# Patient Record
Sex: FEMALE | Race: OTHER | HISPANIC OR LATINO | URBAN - METROPOLITAN AREA
[De-identification: names, ages, dates, MRNs, and addresses within clinical notes are randomized per-mention and may not be internally consistent; named-entity substitution may affect disease eponyms.]

---

## 2023-07-21 ENCOUNTER — EMERGENCY (EMERGENCY)
Facility: HOSPITAL | Age: 38
LOS: 1 days | Discharge: HOME | End: 2023-07-21
Attending: EMERGENCY MEDICINE
Payer: MEDICAID

## 2023-07-21 VITALS
HEIGHT: 60 IN | TEMPERATURE: 99 F | RESPIRATION RATE: 16 BRPM | DIASTOLIC BLOOD PRESSURE: 90 MMHG | WEIGHT: 169.98 LBS | SYSTOLIC BLOOD PRESSURE: 147 MMHG | OXYGEN SATURATION: 99 % | HEART RATE: 73 BPM

## 2023-07-21 LAB
ALBUMIN SERPL ELPH-MCNC: 3.7 G/DL — SIGNIFICANT CHANGE UP (ref 3.5–5)
ALP SERPL-CCNC: 75 U/L — SIGNIFICANT CHANGE UP (ref 40–120)
ALT FLD-CCNC: 42 U/L DA — SIGNIFICANT CHANGE UP (ref 10–60)
ANION GAP SERPL CALC-SCNC: 9 MMOL/L — SIGNIFICANT CHANGE UP (ref 5–17)
APPEARANCE UR: CLEAR — SIGNIFICANT CHANGE UP
AST SERPL-CCNC: 23 U/L — SIGNIFICANT CHANGE UP (ref 10–40)
BACTERIA # UR AUTO: ABNORMAL /HPF
BASOPHILS # BLD AUTO: 0.01 K/UL — SIGNIFICANT CHANGE UP (ref 0–0.2)
BASOPHILS NFR BLD AUTO: 0.2 % — SIGNIFICANT CHANGE UP (ref 0–2)
BILIRUB SERPL-MCNC: 0.2 MG/DL — SIGNIFICANT CHANGE UP (ref 0.2–1.2)
BILIRUB UR-MCNC: NEGATIVE — SIGNIFICANT CHANGE UP
BUN SERPL-MCNC: 11 MG/DL — SIGNIFICANT CHANGE UP (ref 7–18)
CALCIUM SERPL-MCNC: 9.3 MG/DL — SIGNIFICANT CHANGE UP (ref 8.4–10.5)
CHLORIDE SERPL-SCNC: 105 MMOL/L — SIGNIFICANT CHANGE UP (ref 96–108)
CO2 SERPL-SCNC: 25 MMOL/L — SIGNIFICANT CHANGE UP (ref 22–31)
COLOR SPEC: YELLOW — SIGNIFICANT CHANGE UP
CREAT SERPL-MCNC: 0.49 MG/DL — LOW (ref 0.5–1.3)
DIFF PNL FLD: ABNORMAL
EGFR: 125 ML/MIN/1.73M2 — SIGNIFICANT CHANGE UP
EOSINOPHIL # BLD AUTO: 0.07 K/UL — SIGNIFICANT CHANGE UP (ref 0–0.5)
EOSINOPHIL NFR BLD AUTO: 1.1 % — SIGNIFICANT CHANGE UP (ref 0–6)
EPI CELLS # UR: SIGNIFICANT CHANGE UP /HPF
GLUCOSE SERPL-MCNC: 102 MG/DL — HIGH (ref 70–99)
GLUCOSE UR QL: NEGATIVE — SIGNIFICANT CHANGE UP
HCG SERPL-ACNC: HIGH MIU/ML
HCT VFR BLD CALC: 43.4 % — SIGNIFICANT CHANGE UP (ref 34.5–45)
HGB BLD-MCNC: 14.3 G/DL — SIGNIFICANT CHANGE UP (ref 11.5–15.5)
IMM GRANULOCYTES NFR BLD AUTO: 0.3 % — SIGNIFICANT CHANGE UP (ref 0–0.9)
KETONES UR-MCNC: NEGATIVE — SIGNIFICANT CHANGE UP
LEUKOCYTE ESTERASE UR-ACNC: ABNORMAL
LYMPHOCYTES # BLD AUTO: 2.32 K/UL — SIGNIFICANT CHANGE UP (ref 1–3.3)
LYMPHOCYTES # BLD AUTO: 35.3 % — SIGNIFICANT CHANGE UP (ref 13–44)
MCHC RBC-ENTMCNC: 26.4 PG — LOW (ref 27–34)
MCHC RBC-ENTMCNC: 32.9 GM/DL — SIGNIFICANT CHANGE UP (ref 32–36)
MCV RBC AUTO: 80.2 FL — SIGNIFICANT CHANGE UP (ref 80–100)
MONOCYTES # BLD AUTO: 0.42 K/UL — SIGNIFICANT CHANGE UP (ref 0–0.9)
MONOCYTES NFR BLD AUTO: 6.4 % — SIGNIFICANT CHANGE UP (ref 2–14)
NEUTROPHILS # BLD AUTO: 3.73 K/UL — SIGNIFICANT CHANGE UP (ref 1.8–7.4)
NEUTROPHILS NFR BLD AUTO: 56.7 % — SIGNIFICANT CHANGE UP (ref 43–77)
NITRITE UR-MCNC: NEGATIVE — SIGNIFICANT CHANGE UP
NRBC # BLD: 0 /100 WBCS — SIGNIFICANT CHANGE UP (ref 0–0)
PH UR: 6 — SIGNIFICANT CHANGE UP (ref 5–8)
PLATELET # BLD AUTO: 324 K/UL — SIGNIFICANT CHANGE UP (ref 150–400)
POTASSIUM SERPL-MCNC: 3.8 MMOL/L — SIGNIFICANT CHANGE UP (ref 3.5–5.3)
POTASSIUM SERPL-SCNC: 3.8 MMOL/L — SIGNIFICANT CHANGE UP (ref 3.5–5.3)
PROT SERPL-MCNC: 8.2 G/DL — SIGNIFICANT CHANGE UP (ref 6–8.3)
PROT UR-MCNC: 15 MG/DL
RBC # BLD: 5.41 M/UL — HIGH (ref 3.8–5.2)
RBC # FLD: 13.9 % — SIGNIFICANT CHANGE UP (ref 10.3–14.5)
RBC CASTS # UR COMP ASSIST: ABNORMAL /HPF (ref 0–2)
SODIUM SERPL-SCNC: 139 MMOL/L — SIGNIFICANT CHANGE UP (ref 135–145)
SP GR SPEC: 1.01 — SIGNIFICANT CHANGE UP (ref 1.01–1.02)
UROBILINOGEN FLD QL: NEGATIVE — SIGNIFICANT CHANGE UP
WBC # BLD: 6.57 K/UL — SIGNIFICANT CHANGE UP (ref 3.8–10.5)
WBC # FLD AUTO: 6.57 K/UL — SIGNIFICANT CHANGE UP (ref 3.8–10.5)
WBC UR QL: ABNORMAL /HPF (ref 0–5)

## 2023-07-21 PROCEDURE — 76817 TRANSVAGINAL US OBSTETRIC: CPT | Mod: 26

## 2023-07-21 PROCEDURE — 99284 EMERGENCY DEPT VISIT MOD MDM: CPT

## 2023-07-21 PROCEDURE — 80053 COMPREHEN METABOLIC PANEL: CPT

## 2023-07-21 PROCEDURE — 81001 URINALYSIS AUTO W/SCOPE: CPT

## 2023-07-21 PROCEDURE — 87086 URINE CULTURE/COLONY COUNT: CPT

## 2023-07-21 PROCEDURE — 99284 EMERGENCY DEPT VISIT MOD MDM: CPT | Mod: 25

## 2023-07-21 PROCEDURE — 36415 COLL VENOUS BLD VENIPUNCTURE: CPT

## 2023-07-21 PROCEDURE — 76801 OB US < 14 WKS SINGLE FETUS: CPT | Mod: 26

## 2023-07-21 PROCEDURE — 85025 COMPLETE CBC W/AUTO DIFF WBC: CPT

## 2023-07-21 PROCEDURE — 76801 OB US < 14 WKS SINGLE FETUS: CPT

## 2023-07-21 PROCEDURE — 76817 TRANSVAGINAL US OBSTETRIC: CPT

## 2023-07-21 PROCEDURE — 84702 CHORIONIC GONADOTROPIN TEST: CPT

## 2023-07-21 RX ORDER — CEFUROXIME AXETIL 250 MG
1 TABLET ORAL
Qty: 10 | Refills: 0
Start: 2023-07-21 | End: 2023-07-25

## 2023-07-21 RX ORDER — CEPHALEXIN 500 MG
250 CAPSULE ORAL EVERY 6 HOURS
Refills: 0 | Status: DISCONTINUED | OUTPATIENT
Start: 2023-07-21 | End: 2023-07-25

## 2023-07-21 RX ADMIN — Medication 250 MILLIGRAM(S): at 20:31

## 2023-07-21 NOTE — ED ADULT NURSE NOTE - NSFALLUNIVINTERV_ED_ALL_ED
Bed/Stretcher in lowest position, wheels locked, appropriate side rails in place/Call bell, personal items and telephone in reach/Instruct patient to call for assistance before getting out of bed/chair/stretcher/Non-slip footwear applied when patient is off stretcher/[place] to call system/Physically safe environment - no spills, clutter or unnecessary equipment/Purposeful proactive rounding/Room/bathroom lighting operational, light cord in reach

## 2023-07-21 NOTE — ED ADULT NURSE NOTE - TEMPLATE LIST FOR HEAD TO TOE ASSESSMENT
OB/GYN Itraconazole Counseling:  I discussed with the patient the risks of itraconazole including but not limited to liver damage, nausea/vomiting, neuropathy, and severe allergy.  The patient understands that this medication is best absorbed when taken with acidic beverages such as non-diet cola or ginger ale.  The patient understands that monitoring is required including baseline LFTs and repeat LFTs at intervals.  The patient understands that they are to contact us or the primary physician if concerning signs are noted.

## 2023-07-21 NOTE — ED PROVIDER NOTE - CLINICAL SUMMARY MEDICAL DECISION MAKING FREE TEXT BOX
labs, ua, us, to assess viability of pregnancy, pe, vitals normal, not hemorraging, stable for dc if not ectopic

## 2023-07-21 NOTE — ED ADULT NURSE NOTE - CAS ELECT INFOMATION PROVIDED
DC instructions Propranolol Pregnancy And Lactation Text: This medication is Pregnancy Category C and it isn't known if it is safe during pregnancy. It is excreted in breast milk.

## 2023-07-21 NOTE — ED PROVIDER NOTE - PATIENT PORTAL LINK FT
You can access the FollowMyHealth Patient Portal offered by [facility] by registering at the following website: [email or web address]. By joining [employer]’s FollowMyHealth portal, you will also be able to view your health information using other applications (apps) compatible with our system.

## 2023-07-21 NOTE — ED PROVIDER NOTE - PHYSICAL EXAMINATION
General: Well appearing, no acute distress, appears stated age  HEENT: normocephalic, atraumatic   Respiratory: normal work of breathing  MSK: no swelling or tenderness of lower extremities, moving all extremities spontaneously   Skin: warm, dry  Neuro: A&Ox3, cranial nerves II-XII intact, 5/5 strength in all extremities, no sensory deficits, normal gait   Psych: appropriate affect

## 2023-07-21 NOTE — ED PROVIDER NOTE - NSFOLLOWUPINSTRUCTIONS_ED_ALL_ED_FT
Amenaza de aborto espontáneo    LO QUE NECESITAS SABER:    Xu amenaza de aborto espontáneo ocurre cuando tiene sangrado vaginal dentro de las primeras 20 semanas de embarazo. Significa que puede ocurrir un aborto espontáneo. Xu amenaza de aborto espontáneo también puede llamarse xu amenaza de aborto.    INSTRUCCIONES DE DESCARGA:    Regrese al departamento de emergencias si:    Se siente débil o mareado.    Cedeno dolor o calambres en el abdomen o la espalda empeoran.    Tiene sangrado vaginal que empapa 1 o más toallas sanitarias en xu hora.    Expulsa material que parece tejido o coágulos grandes.    Tienes fiebre.    Tiene problemas para orinar, ardor al orinar o siente la necesidad de orinar con frecuencia.    Tiene sangrado vaginal nuevo o que empeora.    Tiene dolor o picazón vaginal, o flujo vaginal de color amarillo, arnie o maloliente.    No ponga nada en cedeno vagina. No tenga relaciones sexuales, duchas vaginales ni use tampones. Estas acciones pueden aumentar cedeno riesgo de infección y aborto espontáneo.      Descanse según las indicaciones. No betty ejercicio ni realice actividades extenuantes. Estas actividades pueden causar un parto prematuro o un aborto espontáneo. Pregúntele a cedeno proveedor de atención médica qué actividades puede hacer.    Manténgase saludable shadi el embarazo:    Coma xu variedad de alimentos saludables. Los alimentos saludables pueden ayudarla a obtener proteínas, agua y calorías adicionales que necesita shadi el embarazo. Los alimentos saludables incluyen frutas, verduras, panes integrales, productos lácteos bajos en grasa, frijoles, babatunde magras y pescado. Evite la carne y el pescado crudos o poco cocidos. Pregúntele a cedeno proveedor de atención médica si necesita xu dieta especial.      Dillard las vitaminas prenatales según las indicaciones. Estos le ayudan a obtener la cantidad adecuada de vitaminas y minerales. También pueden disminuir el riesgo de ciertos defectos de nacimiento.      No anthony alcohol ni use drogas ilegales. Estos pueden aumentar cedeno riesgo de aborto espontáneo o dañar a cedeno bebé.      No fume. La nicotina y otras sustancias químicas en los cigarrillos y cigarros pueden dañar a cedeno bebé y provocar un aborto espontáneo o un parto prematuro. Pida información a cedeno proveedor de atención médica si actualmente fuma y necesita ayuda para dejar de hacerlo. Los cigarrillos electrónicos o el tabaco sin humo todavía contienen nicotina. No utilice estos productos.      Disminuir el riesgo de xu infección. Siempre lávese las artem antes de comer o preparar comidas. No pase tiempo con personas enfermas. Pregúntele a cedeno proveedor de atención médica si necesita vacunas rima la vacuna contra la gripe o la hepatitis B. Las vacunas pueden disminuir el riesgo de infecciones que podrían causar un aborto espontáneo.      Maneje janny condiciones médicas. Mantenga cedeno presión arterial y azúcar en la klaudia bajo control. Mantener un peso saludable shadi el embarazo.    Betty un seguimiento con cedeno obstetra según las indicaciones: Es posible que necesite kamran a cedeno obstetra con frecuencia para hacerse ecografías o análisis de klaudia. Anote janny preguntas para que recuerde hacerlas shadi janny visitas.

## 2023-07-22 LAB
CULTURE RESULTS: SIGNIFICANT CHANGE UP
SPECIMEN SOURCE: SIGNIFICANT CHANGE UP

## 2023-08-25 ENCOUNTER — APPOINTMENT (OUTPATIENT)
Dept: OBGYN | Facility: CLINIC | Age: 38
End: 2023-08-25
Payer: MEDICAID

## 2023-08-25 ENCOUNTER — OUTPATIENT (OUTPATIENT)
Dept: OUTPATIENT SERVICES | Facility: HOSPITAL | Age: 38
LOS: 1 days | End: 2023-08-25
Payer: MEDICAID

## 2023-08-25 ENCOUNTER — TRANSCRIPTION ENCOUNTER (OUTPATIENT)
Age: 38
End: 2023-08-25

## 2023-08-25 ENCOUNTER — LABORATORY RESULT (OUTPATIENT)
Age: 38
End: 2023-08-25

## 2023-08-25 VITALS
HEIGHT: 60 IN | TEMPERATURE: 99 F | RESPIRATION RATE: 18 BRPM | HEART RATE: 76 BPM | OXYGEN SATURATION: 99 % | SYSTOLIC BLOOD PRESSURE: 123 MMHG | WEIGHT: 170 LBS | BODY MASS INDEX: 33.38 KG/M2 | DIASTOLIC BLOOD PRESSURE: 80 MMHG

## 2023-08-25 DIAGNOSIS — Z34.00 ENCOUNTER FOR SUPERVISION OF NORMAL FIRST PREGNANCY, UNSPECIFIED TRIMESTER: ICD-10-CM

## 2023-08-25 DIAGNOSIS — Z78.9 OTHER SPECIFIED HEALTH STATUS: ICD-10-CM

## 2023-08-25 DIAGNOSIS — O24.419 GESTATIONAL DIABETES MELLITUS IN PREGNANCY, UNSPECIFIED CONTROL: ICD-10-CM

## 2023-08-25 PROBLEM — Z00.00 ENCOUNTER FOR PREVENTIVE HEALTH EXAMINATION: Status: ACTIVE | Noted: 2023-08-25

## 2023-08-25 PROCEDURE — 81003 URINALYSIS AUTO W/O SCOPE: CPT

## 2023-08-25 PROCEDURE — 99204 OFFICE O/P NEW MOD 45 MIN: CPT | Mod: TH

## 2023-08-25 PROCEDURE — 86850 RBC ANTIBODY SCREEN: CPT

## 2023-08-25 PROCEDURE — 36415 COLL VENOUS BLD VENIPUNCTURE: CPT

## 2023-08-25 PROCEDURE — 81329 SMN1 GENE DOS/DELETION ALYS: CPT

## 2023-08-25 PROCEDURE — 86901 BLOOD TYPING SEROLOGIC RH(D): CPT

## 2023-08-25 PROCEDURE — 81025 URINE PREGNANCY TEST: CPT

## 2023-08-25 PROCEDURE — G0463: CPT

## 2023-08-25 PROCEDURE — 86900 BLOOD TYPING SEROLOGIC ABO: CPT

## 2023-08-25 PROCEDURE — 81420 FETAL CHRMOML ANEUPLOIDY: CPT

## 2023-08-25 PROCEDURE — 87491 CHLMYD TRACH DNA AMP PROBE: CPT

## 2023-08-25 PROCEDURE — 87086 URINE CULTURE/COLONY COUNT: CPT

## 2023-08-25 PROCEDURE — 81243 FMR1 GEN ALY DETC ABNL ALLEL: CPT

## 2023-08-28 DIAGNOSIS — Z34.91 ENCOUNTER FOR SUPERVISION OF NORMAL PREGNANCY, UNSPECIFIED, FIRST TRIMESTER: ICD-10-CM

## 2023-08-28 LAB
ABO + RH PNL BLD: NORMAL
BACTERIA UR CULT: NORMAL
BLD GP AB SCN SERPL QL: NORMAL

## 2023-08-29 ENCOUNTER — NON-APPOINTMENT (OUTPATIENT)
Age: 38
End: 2023-08-29

## 2023-08-31 LAB — AR GENE MUT ANL BLD/T: NORMAL

## 2023-09-01 LAB
CHROMOSOME13 INTERPRETATION: NORMAL
CHROMOSOME13 TEST RESULT: NORMAL
CHROMOSOME18 INTERPRETATION: NORMAL
CHROMOSOME18 TEST RESULT: NORMAL
CHROMOSOME21 INTERPRETATION: NORMAL
CHROMOSOME21 TEST RESULT: NORMAL
FETAL FRACTION: NORMAL
PERFORMANCE AND LIMITATIONS: NORMAL
SEX CHROMOSOME INTERPRETATION: NORMAL
SEX CHROMOSOME TEST RESULT: NORMAL
VERIFI PRENATAL TEST: NOT DETECTED

## 2023-09-07 LAB — FMR1 GENE MUT ANL BLD/T: NORMAL

## 2023-09-15 ENCOUNTER — APPOINTMENT (OUTPATIENT)
Dept: GYNECOLOGIC ONCOLOGY | Facility: CLINIC | Age: 38
End: 2023-09-15
Payer: MEDICAID

## 2023-09-15 VITALS
BODY MASS INDEX: 33.38 KG/M2 | DIASTOLIC BLOOD PRESSURE: 87 MMHG | HEART RATE: 79 BPM | WEIGHT: 170 LBS | HEIGHT: 60 IN | SYSTOLIC BLOOD PRESSURE: 128 MMHG

## 2023-09-15 PROCEDURE — 99205 OFFICE O/P NEW HI 60 MIN: CPT | Mod: TH

## 2023-09-20 ENCOUNTER — APPOINTMENT (OUTPATIENT)
Dept: OBGYN | Facility: CLINIC | Age: 38
End: 2023-09-20
Payer: MEDICAID

## 2023-09-20 ENCOUNTER — OUTPATIENT (OUTPATIENT)
Dept: OUTPATIENT SERVICES | Facility: HOSPITAL | Age: 38
LOS: 1 days | End: 2023-09-20
Payer: MEDICAID

## 2023-09-20 ENCOUNTER — APPOINTMENT (OUTPATIENT)
Dept: MATERNAL FETAL MEDICINE | Facility: CLINIC | Age: 38
End: 2023-09-20

## 2023-09-20 VITALS
BODY MASS INDEX: 33.57 KG/M2 | DIASTOLIC BLOOD PRESSURE: 68 MMHG | OXYGEN SATURATION: 100 % | RESPIRATION RATE: 18 BRPM | HEART RATE: 83 BPM | HEIGHT: 60 IN | WEIGHT: 171 LBS | TEMPERATURE: 96.8 F | SYSTOLIC BLOOD PRESSURE: 105 MMHG

## 2023-09-20 DIAGNOSIS — Z87.51 PERSONAL HISTORY OF PRE-TERM LABOR: ICD-10-CM

## 2023-09-20 DIAGNOSIS — O09.529 SUPERVISION OF ELDERLY MULTIGRAVIDA, UNSPECIFIED TRIMESTER: ICD-10-CM

## 2023-09-20 DIAGNOSIS — Z3A.13 13 WEEKS GESTATION OF PREGNANCY: ICD-10-CM

## 2023-09-20 DIAGNOSIS — Z34.00 ENCOUNTER FOR SUPERVISION OF NORMAL FIRST PREGNANCY, UNSPECIFIED TRIMESTER: ICD-10-CM

## 2023-09-20 DIAGNOSIS — Z34.92 ENCOUNTER FOR SUPERVISION OF NORMAL PREGNANCY, UNSPECIFIED, SECOND TRIMESTER: ICD-10-CM

## 2023-09-20 DIAGNOSIS — O24.319 UNSPECIFIED PRE-EXISTING DIABETES MELLITUS IN PREGNANCY, UNSPECIFIED TRIMESTER: ICD-10-CM

## 2023-09-20 DIAGNOSIS — R87.613 HIGH GRADE SQUAMOUS INTRAEPITHELIAL LESION ON CYTOLOGIC SMEAR OF CERVIX (HGSIL): ICD-10-CM

## 2023-09-20 DIAGNOSIS — E66.01 MORBID (SEVERE) OBESITY DUE TO EXCESS CALORIES: ICD-10-CM

## 2023-09-20 PROCEDURE — 86762 RUBELLA ANTIBODY: CPT

## 2023-09-20 PROCEDURE — 83655 ASSAY OF LEAD: CPT

## 2023-09-20 PROCEDURE — 81003 URINALYSIS AUTO W/O SCOPE: CPT

## 2023-09-20 PROCEDURE — 86787 VARICELLA-ZOSTER ANTIBODY: CPT

## 2023-09-20 PROCEDURE — G0463: CPT

## 2023-09-20 PROCEDURE — 99214 OFFICE O/P EST MOD 30 MIN: CPT | Mod: TH,25

## 2023-09-20 PROCEDURE — 83036 HEMOGLOBIN GLYCOSYLATED A1C: CPT

## 2023-09-20 PROCEDURE — 86780 TREPONEMA PALLIDUM: CPT

## 2023-09-20 PROCEDURE — 86803 HEPATITIS C AB TEST: CPT

## 2023-09-20 PROCEDURE — 87340 HEPATITIS B SURFACE AG IA: CPT

## 2023-09-20 PROCEDURE — 87389 HIV-1 AG W/HIV-1&-2 AB AG IA: CPT

## 2023-09-20 PROCEDURE — 80053 COMPREHEN METABOLIC PANEL: CPT

## 2023-09-20 PROCEDURE — 86480 TB TEST CELL IMMUN MEASURE: CPT

## 2023-09-20 PROCEDURE — 36415 COLL VENOUS BLD VENIPUNCTURE: CPT

## 2023-09-20 PROCEDURE — 85025 COMPLETE CBC W/AUTO DIFF WBC: CPT

## 2023-09-20 PROCEDURE — 82105 ALPHA-FETOPROTEIN SERUM: CPT

## 2023-09-20 PROCEDURE — 36415 COLL VENOUS BLD VENIPUNCTURE: CPT | Mod: NC

## 2023-09-20 PROCEDURE — 84443 ASSAY THYROID STIM HORMONE: CPT

## 2023-09-20 PROCEDURE — 82306 VITAMIN D 25 HYDROXY: CPT

## 2023-09-20 PROCEDURE — 86765 RUBEOLA ANTIBODY: CPT

## 2023-09-20 RX ORDER — ASPIRIN 81 MG/1
81 TABLET, CHEWABLE ORAL
Qty: 60 | Refills: 9 | Status: ACTIVE | COMMUNITY
Start: 2023-09-20 | End: 1900-01-01

## 2023-09-20 RX ORDER — MULTIVIT-MIN/FOLIC/VIT K/LYCOP 400-300MCG
28-0.8 TABLET ORAL
Refills: 0 | Status: COMPLETED | COMMUNITY
End: 2023-09-20

## 2023-09-20 RX ORDER — CALCIUM CARBONATE 300MG(750)
0.4-32.5 TABLET,CHEWABLE ORAL
Qty: 30 | Refills: 11 | Status: ACTIVE | COMMUNITY
Start: 2023-09-20 | End: 1900-01-01

## 2023-09-21 DIAGNOSIS — Z87.51 PERSONAL HISTORY OF PRE-TERM LABOR: ICD-10-CM

## 2023-09-21 DIAGNOSIS — R87.613 HIGH GRADE SQUAMOUS INTRAEPITHELIAL LESION ON CYTOLOGIC SMEAR OF CERVIX (HGSIL): ICD-10-CM

## 2023-09-21 DIAGNOSIS — O09.529 SUPERVISION OF ELDERLY MULTIGRAVIDA, UNSPECIFIED TRIMESTER: ICD-10-CM

## 2023-09-21 DIAGNOSIS — O24.319 UNSPECIFIED PRE-EXISTING DIABETES MELLITUS IN PREGNANCY, UNSPECIFIED TRIMESTER: ICD-10-CM

## 2023-09-21 DIAGNOSIS — E66.01 MORBID (SEVERE) OBESITY DUE TO EXCESS CALORIES: ICD-10-CM

## 2023-09-21 DIAGNOSIS — Z34.92 ENCOUNTER FOR SUPERVISION OF NORMAL PREGNANCY, UNSPECIFIED, SECOND TRIMESTER: ICD-10-CM

## 2023-09-21 LAB
25(OH)D3 SERPL-MCNC: 20.1 NG/ML
ALBUMIN SERPL ELPH-MCNC: 4.2 G/DL
ALP BLD-CCNC: 69 U/L
ALT SERPL-CCNC: 20 U/L
ANION GAP SERPL CALC-SCNC: 13 MMOL/L
AST SERPL-CCNC: 21 U/L
BASOPHILS # BLD AUTO: 0.02 K/UL
BASOPHILS NFR BLD AUTO: 0.2 %
BILIRUB SERPL-MCNC: 0.3 MG/DL
BUN SERPL-MCNC: 8 MG/DL
CALCIUM SERPL-MCNC: 8.8 MG/DL
CHLORIDE SERPL-SCNC: 103 MMOL/L
CO2 SERPL-SCNC: 21 MMOL/L
CREAT SERPL-MCNC: 0.4 MG/DL
EGFR: 131 ML/MIN/1.73M2
EOSINOPHIL # BLD AUTO: 0.05 K/UL
EOSINOPHIL NFR BLD AUTO: 0.6 %
ESTIMATED AVERAGE GLUCOSE: 131 MG/DL
GLUCOSE SERPL-MCNC: 130 MG/DL
HBA1C MFR BLD HPLC: 6.2 %
HBV SURFACE AG SER QL: NONREACTIVE
HCT VFR BLD CALC: 39.3 %
HCV AB SER QL: NONREACTIVE
HCV S/CO RATIO: 0.06 S/CO
HGB BLD-MCNC: 13 G/DL
HIV1+2 AB SPEC QL IA.RAPID: NONREACTIVE
IMM GRANULOCYTES NFR BLD AUTO: 0.7 %
LYMPHOCYTES # BLD AUTO: 1.75 K/UL
LYMPHOCYTES NFR BLD AUTO: 21.5 %
MAN DIFF?: NORMAL
MCHC RBC-ENTMCNC: 28.4 PG
MCHC RBC-ENTMCNC: 33.1 GM/DL
MCV RBC AUTO: 85.8 FL
MONOCYTES # BLD AUTO: 0.52 K/UL
MONOCYTES NFR BLD AUTO: 6.4 %
NEUTROPHILS # BLD AUTO: 5.75 K/UL
NEUTROPHILS NFR BLD AUTO: 70.6 %
PLATELET # BLD AUTO: 251 K/UL
POTASSIUM SERPL-SCNC: 4 MMOL/L
PROT SERPL-MCNC: 7.3 G/DL
RBC # BLD: 4.58 M/UL
RBC # FLD: 15.2 %
SODIUM SERPL-SCNC: 137 MMOL/L
T PALLIDUM AB SER QL IA: NEGATIVE
TSH SERPL-ACNC: 1.47 UIU/ML
WBC # FLD AUTO: 8.15 K/UL

## 2023-09-21 RX ORDER — CHROMIUM 200 MCG
25 MCG TABLET ORAL DAILY
Qty: 30 | Refills: 11 | Status: ACTIVE | COMMUNITY
Start: 2023-09-21 | End: 1900-01-01

## 2023-09-22 LAB
LEAD BLD-MCNC: 1.2 UG/DL
M TB IFN-G BLD-IMP: NEGATIVE
MEV IGG FLD QL IA: 19.5 AU/ML
MEV IGG+IGM SER-IMP: POSITIVE
QUANTIFERON TB PLUS MITOGEN MINUS NIL: 2.95 IU/ML
QUANTIFERON TB PLUS NIL: 0.03 IU/ML
QUANTIFERON TB PLUS TB1 MINUS NIL: 0 IU/ML
QUANTIFERON TB PLUS TB2 MINUS NIL: 0 IU/ML
RUBV IGG FLD-ACNC: 3.4 INDEX
RUBV IGG SER-IMP: POSITIVE
VZV AB TITR SER: POSITIVE
VZV IGG SER IF-ACNC: 193.3 INDEX

## 2023-09-25 ENCOUNTER — APPOINTMENT (OUTPATIENT)
Dept: MATERNAL FETAL MEDICINE | Facility: CLINIC | Age: 38
End: 2023-09-25
Payer: MEDICAID

## 2023-09-25 ENCOUNTER — OUTPATIENT (OUTPATIENT)
Dept: OUTPATIENT SERVICES | Facility: HOSPITAL | Age: 38
LOS: 1 days | End: 2023-09-25

## 2023-09-25 ENCOUNTER — ASOB RESULT (OUTPATIENT)
Age: 38
End: 2023-09-25

## 2023-09-25 DIAGNOSIS — N87.9 DYSPLASIA OF CERVIX UTERI, UNSPECIFIED: ICD-10-CM

## 2023-09-25 PROCEDURE — G0108 DIAB MANAGE TRN  PER INDIV: CPT

## 2023-09-27 ENCOUNTER — ASOB RESULT (OUTPATIENT)
Age: 38
End: 2023-09-27

## 2023-09-27 ENCOUNTER — APPOINTMENT (OUTPATIENT)
Dept: ANTEPARTUM | Facility: CLINIC | Age: 38
End: 2023-09-27
Payer: MEDICAID

## 2023-09-27 LAB
AFP MOM: 1.95
AFP VALUE: 52.6 NG/ML
ALPHA FETOPROTEIN SERUM COMMENT: NORMAL
ALPHA FETOPROTEIN SERUM INTERPRETATION: NORMAL
ALPHA FETOPROTEIN SERUM RESULTS: NORMAL
ALPHA FETOPROTEIN SERUM TEST RESULTS: NORMAL
GESTATIONAL AGE BASED ON: NORMAL
GESTATIONAL AGE ON COLLECTION DATE: 16.6 WEEKS
INSULIN DEP DIABETES: YES
MATERNAL AGE AT EDD AFP: 37.5 YR
MULTIPLE GESTATION: NO
OSBR RISK 1 IN: 269
RACE: NORMAL
WEIGHT AFP: 171 LBS

## 2023-09-27 PROCEDURE — 76805 OB US >/= 14 WKS SNGL FETUS: CPT

## 2023-09-27 RX ORDER — ISOPROPYL ALCOHOL 0.7 ML/ML
SWAB TOPICAL
Qty: 1 | Refills: 2 | Status: ACTIVE | COMMUNITY
Start: 2023-09-25 | End: 1900-01-01

## 2023-09-27 RX ORDER — PEN NEEDLE, DIABETIC 32GX 5/32"
32G X 4 MM NEEDLE, DISPOSABLE MISCELLANEOUS
Qty: 2 | Refills: 1 | Status: ACTIVE | COMMUNITY
Start: 2023-09-25 | End: 1900-01-01

## 2023-09-27 RX ORDER — INSULIN HUMAN 100 [IU]/ML
100 INJECTION, SUSPENSION SUBCUTANEOUS
Qty: 1 | Refills: 3 | Status: ACTIVE | COMMUNITY
Start: 2023-09-25 | End: 1900-01-01

## 2023-09-28 ENCOUNTER — NON-APPOINTMENT (OUTPATIENT)
Age: 38
End: 2023-09-28

## 2023-09-28 ENCOUNTER — RESULT REVIEW (OUTPATIENT)
Age: 38
End: 2023-09-28

## 2023-09-28 LAB — SURGICAL PATHOLOGY STUDY: SIGNIFICANT CHANGE UP

## 2023-09-28 RX ORDER — SYRINGE-NEEDLE,INSULIN,0.5 ML 30 G X1/2"
30G X 1/2" SYRINGE, EMPTY DISPOSABLE MISCELLANEOUS
Qty: 2 | Refills: 1 | Status: ACTIVE | COMMUNITY
Start: 2023-09-28 | End: 1900-01-01

## 2023-09-29 ENCOUNTER — NON-APPOINTMENT (OUTPATIENT)
Age: 38
End: 2023-09-29

## 2023-10-02 ENCOUNTER — APPOINTMENT (OUTPATIENT)
Dept: MATERNAL FETAL MEDICINE | Facility: CLINIC | Age: 38
End: 2023-10-02
Payer: MEDICAID

## 2023-10-02 ENCOUNTER — ASOB RESULT (OUTPATIENT)
Age: 38
End: 2023-10-02

## 2023-10-02 PROCEDURE — G0108 DIAB MANAGE TRN  PER INDIV: CPT | Mod: 95

## 2023-10-04 ENCOUNTER — NON-APPOINTMENT (OUTPATIENT)
Age: 38
End: 2023-10-04

## 2023-10-04 ENCOUNTER — APPOINTMENT (OUTPATIENT)
Dept: GYNECOLOGIC ONCOLOGY | Facility: CLINIC | Age: 38
End: 2023-10-04
Payer: MEDICAID

## 2023-10-04 PROCEDURE — 99212 OFFICE O/P EST SF 10 MIN: CPT | Mod: TH,95

## 2023-10-11 ENCOUNTER — APPOINTMENT (OUTPATIENT)
Dept: GYNECOLOGIC ONCOLOGY | Facility: CLINIC | Age: 38
End: 2023-10-11
Payer: MEDICAID

## 2023-10-11 PROCEDURE — 99212 OFFICE O/P EST SF 10 MIN: CPT | Mod: TH,95

## 2023-10-12 ENCOUNTER — APPOINTMENT (OUTPATIENT)
Dept: MATERNAL FETAL MEDICINE | Facility: CLINIC | Age: 38
End: 2023-10-12
Payer: MEDICAID

## 2023-10-12 ENCOUNTER — ASOB RESULT (OUTPATIENT)
Age: 38
End: 2023-10-12

## 2023-10-12 PROCEDURE — G0108 DIAB MANAGE TRN  PER INDIV: CPT | Mod: 95

## 2023-10-15 LAB — CORE LAB BIOPSY: NORMAL

## 2023-10-17 ENCOUNTER — APPOINTMENT (OUTPATIENT)
Dept: GYNECOLOGIC ONCOLOGY | Facility: CLINIC | Age: 38
End: 2023-10-17
Payer: MEDICAID

## 2023-10-17 VITALS
WEIGHT: 173.38 LBS | DIASTOLIC BLOOD PRESSURE: 61 MMHG | SYSTOLIC BLOOD PRESSURE: 120 MMHG | HEIGHT: 60 IN | BODY MASS INDEX: 34.04 KG/M2 | HEART RATE: 74 BPM

## 2023-10-17 PROCEDURE — 99215 OFFICE O/P EST HI 40 MIN: CPT | Mod: TH

## 2023-10-19 ENCOUNTER — ASOB RESULT (OUTPATIENT)
Age: 38
End: 2023-10-19

## 2023-10-19 ENCOUNTER — NON-APPOINTMENT (OUTPATIENT)
Age: 38
End: 2023-10-19

## 2023-10-19 ENCOUNTER — APPOINTMENT (OUTPATIENT)
Dept: MATERNAL FETAL MEDICINE | Facility: CLINIC | Age: 38
End: 2023-10-19
Payer: MEDICAID

## 2023-10-19 PROCEDURE — 99204 OFFICE O/P NEW MOD 45 MIN: CPT | Mod: TH,95

## 2023-10-20 ENCOUNTER — OUTPATIENT (OUTPATIENT)
Dept: OUTPATIENT SERVICES | Facility: HOSPITAL | Age: 38
LOS: 1 days | End: 2023-10-20
Payer: MEDICAID

## 2023-10-20 ENCOUNTER — NON-APPOINTMENT (OUTPATIENT)
Age: 38
End: 2023-10-20

## 2023-10-20 ENCOUNTER — INPATIENT (INPATIENT)
Facility: HOSPITAL | Age: 38
LOS: 1 days | Discharge: HOME | End: 2023-10-22
Attending: SPECIALIST | Admitting: SPECIALIST
Payer: MEDICAID

## 2023-10-20 ENCOUNTER — APPOINTMENT (OUTPATIENT)
Dept: ANTEPARTUM | Facility: CLINIC | Age: 38
End: 2023-10-20
Payer: MEDICAID

## 2023-10-20 ENCOUNTER — APPOINTMENT (OUTPATIENT)
Age: 38
End: 2023-10-20
Payer: MEDICAID

## 2023-10-20 ENCOUNTER — ASOB RESULT (OUTPATIENT)
Age: 38
End: 2023-10-20

## 2023-10-20 ENCOUNTER — APPOINTMENT (OUTPATIENT)
Dept: OBGYN | Facility: CLINIC | Age: 38
End: 2023-10-20
Payer: MEDICAID

## 2023-10-20 VITALS
HEART RATE: 88 BPM | SYSTOLIC BLOOD PRESSURE: 124 MMHG | RESPIRATION RATE: 16 BRPM | DIASTOLIC BLOOD PRESSURE: 60 MMHG | TEMPERATURE: 98 F

## 2023-10-20 VITALS
RESPIRATION RATE: 18 BRPM | SYSTOLIC BLOOD PRESSURE: 115 MMHG | BODY MASS INDEX: 34.55 KG/M2 | WEIGHT: 176 LBS | HEIGHT: 60 IN | OXYGEN SATURATION: 98 % | DIASTOLIC BLOOD PRESSURE: 74 MMHG | TEMPERATURE: 97.2 F | HEART RATE: 81 BPM

## 2023-10-20 DIAGNOSIS — Z90.49 ACQUIRED ABSENCE OF OTHER SPECIFIED PARTS OF DIGESTIVE TRACT: Chronic | ICD-10-CM

## 2023-10-20 DIAGNOSIS — C53.9 MALIGNANT NEOPLASM OF CERVIX UTERI, UNSPECIFIED: ICD-10-CM

## 2023-10-20 DIAGNOSIS — O26.899 OTHER SPECIFIED PREGNANCY RELATED CONDITIONS, UNSPECIFIED TRIMESTER: ICD-10-CM

## 2023-10-20 DIAGNOSIS — O42.919 PRETERM PREMATURE RUPTURE OF MEMBRANES, UNSPECIFIED AS TO LENGTH OF TIME BETWEEN RUPTURE AND ONSET OF LABOR, UNSPECIFIED TRIMESTER: ICD-10-CM

## 2023-10-20 DIAGNOSIS — Z34.00 ENCOUNTER FOR SUPERVISION OF NORMAL FIRST PREGNANCY, UNSPECIFIED TRIMESTER: ICD-10-CM

## 2023-10-20 LAB
ALBUMIN SERPL ELPH-MCNC: 4.1 G/DL — SIGNIFICANT CHANGE UP (ref 3.3–5)
ALBUMIN SERPL ELPH-MCNC: 4.1 G/DL — SIGNIFICANT CHANGE UP (ref 3.3–5)
ALP SERPL-CCNC: 75 U/L — SIGNIFICANT CHANGE UP (ref 40–120)
ALP SERPL-CCNC: 75 U/L — SIGNIFICANT CHANGE UP (ref 40–120)
ALT FLD-CCNC: 22 U/L — SIGNIFICANT CHANGE UP (ref 4–33)
ALT FLD-CCNC: 22 U/L — SIGNIFICANT CHANGE UP (ref 4–33)
ANION GAP SERPL CALC-SCNC: 12 MMOL/L — SIGNIFICANT CHANGE UP (ref 7–14)
ANION GAP SERPL CALC-SCNC: 12 MMOL/L — SIGNIFICANT CHANGE UP (ref 7–14)
APTT BLD: 31.4 SEC — SIGNIFICANT CHANGE UP (ref 24.5–35.6)
APTT BLD: 31.4 SEC — SIGNIFICANT CHANGE UP (ref 24.5–35.6)
AST SERPL-CCNC: 22 U/L — SIGNIFICANT CHANGE UP (ref 4–32)
AST SERPL-CCNC: 22 U/L — SIGNIFICANT CHANGE UP (ref 4–32)
BASOPHILS # BLD AUTO: 0.03 K/UL — SIGNIFICANT CHANGE UP (ref 0–0.2)
BASOPHILS # BLD AUTO: 0.03 K/UL — SIGNIFICANT CHANGE UP (ref 0–0.2)
BASOPHILS NFR BLD AUTO: 0.3 % — SIGNIFICANT CHANGE UP (ref 0–2)
BASOPHILS NFR BLD AUTO: 0.3 % — SIGNIFICANT CHANGE UP (ref 0–2)
BILIRUB SERPL-MCNC: <0.2 MG/DL — SIGNIFICANT CHANGE UP (ref 0.2–1.2)
BILIRUB SERPL-MCNC: <0.2 MG/DL — SIGNIFICANT CHANGE UP (ref 0.2–1.2)
BLD GP AB SCN SERPL QL: NEGATIVE — SIGNIFICANT CHANGE UP
BLD GP AB SCN SERPL QL: NEGATIVE — SIGNIFICANT CHANGE UP
BUN SERPL-MCNC: 7 MG/DL — SIGNIFICANT CHANGE UP (ref 7–23)
BUN SERPL-MCNC: 7 MG/DL — SIGNIFICANT CHANGE UP (ref 7–23)
CALCIUM SERPL-MCNC: 9 MG/DL — SIGNIFICANT CHANGE UP (ref 8.4–10.5)
CALCIUM SERPL-MCNC: 9 MG/DL — SIGNIFICANT CHANGE UP (ref 8.4–10.5)
CHLORIDE SERPL-SCNC: 104 MMOL/L — SIGNIFICANT CHANGE UP (ref 98–107)
CHLORIDE SERPL-SCNC: 104 MMOL/L — SIGNIFICANT CHANGE UP (ref 98–107)
CO2 SERPL-SCNC: 22 MMOL/L — SIGNIFICANT CHANGE UP (ref 22–31)
CO2 SERPL-SCNC: 22 MMOL/L — SIGNIFICANT CHANGE UP (ref 22–31)
CREAT SERPL-MCNC: 0.42 MG/DL — LOW (ref 0.5–1.3)
CREAT SERPL-MCNC: 0.42 MG/DL — LOW (ref 0.5–1.3)
EGFR: 129 ML/MIN/1.73M2 — SIGNIFICANT CHANGE UP
EGFR: 129 ML/MIN/1.73M2 — SIGNIFICANT CHANGE UP
EOSINOPHIL # BLD AUTO: 0.05 K/UL — SIGNIFICANT CHANGE UP (ref 0–0.5)
EOSINOPHIL # BLD AUTO: 0.05 K/UL — SIGNIFICANT CHANGE UP (ref 0–0.5)
EOSINOPHIL NFR BLD AUTO: 0.5 % — SIGNIFICANT CHANGE UP (ref 0–6)
EOSINOPHIL NFR BLD AUTO: 0.5 % — SIGNIFICANT CHANGE UP (ref 0–6)
FIBRINOGEN PPP-MCNC: 517 MG/DL — HIGH (ref 200–465)
FIBRINOGEN PPP-MCNC: 517 MG/DL — HIGH (ref 200–465)
GLUCOSE SERPL-MCNC: 94 MG/DL — SIGNIFICANT CHANGE UP (ref 70–99)
GLUCOSE SERPL-MCNC: 94 MG/DL — SIGNIFICANT CHANGE UP (ref 70–99)
HCT VFR BLD CALC: 37 % — SIGNIFICANT CHANGE UP (ref 34.5–45)
HCT VFR BLD CALC: 37 % — SIGNIFICANT CHANGE UP (ref 34.5–45)
HGB BLD-MCNC: 12.7 G/DL — SIGNIFICANT CHANGE UP (ref 11.5–15.5)
HGB BLD-MCNC: 12.7 G/DL — SIGNIFICANT CHANGE UP (ref 11.5–15.5)
IANC: 7.54 K/UL — HIGH (ref 1.8–7.4)
IANC: 7.54 K/UL — HIGH (ref 1.8–7.4)
IMM GRANULOCYTES NFR BLD AUTO: 1.3 % — HIGH (ref 0–0.9)
IMM GRANULOCYTES NFR BLD AUTO: 1.3 % — HIGH (ref 0–0.9)
INR BLD: 0.93 RATIO — SIGNIFICANT CHANGE UP (ref 0.85–1.18)
INR BLD: 0.93 RATIO — SIGNIFICANT CHANGE UP (ref 0.85–1.18)
LYMPHOCYTES # BLD AUTO: 1.58 K/UL — SIGNIFICANT CHANGE UP (ref 1–3.3)
LYMPHOCYTES # BLD AUTO: 1.58 K/UL — SIGNIFICANT CHANGE UP (ref 1–3.3)
LYMPHOCYTES # BLD AUTO: 15.9 % — SIGNIFICANT CHANGE UP (ref 13–44)
LYMPHOCYTES # BLD AUTO: 15.9 % — SIGNIFICANT CHANGE UP (ref 13–44)
MCHC RBC-ENTMCNC: 28.6 PG — SIGNIFICANT CHANGE UP (ref 27–34)
MCHC RBC-ENTMCNC: 28.6 PG — SIGNIFICANT CHANGE UP (ref 27–34)
MCHC RBC-ENTMCNC: 34.3 GM/DL — SIGNIFICANT CHANGE UP (ref 32–36)
MCHC RBC-ENTMCNC: 34.3 GM/DL — SIGNIFICANT CHANGE UP (ref 32–36)
MCV RBC AUTO: 83.3 FL — SIGNIFICANT CHANGE UP (ref 80–100)
MCV RBC AUTO: 83.3 FL — SIGNIFICANT CHANGE UP (ref 80–100)
MONOCYTES # BLD AUTO: 0.63 K/UL — SIGNIFICANT CHANGE UP (ref 0–0.9)
MONOCYTES # BLD AUTO: 0.63 K/UL — SIGNIFICANT CHANGE UP (ref 0–0.9)
MONOCYTES NFR BLD AUTO: 6.3 % — SIGNIFICANT CHANGE UP (ref 2–14)
MONOCYTES NFR BLD AUTO: 6.3 % — SIGNIFICANT CHANGE UP (ref 2–14)
NEUTROPHILS # BLD AUTO: 7.54 K/UL — HIGH (ref 1.8–7.4)
NEUTROPHILS # BLD AUTO: 7.54 K/UL — HIGH (ref 1.8–7.4)
NEUTROPHILS NFR BLD AUTO: 75.7 % — SIGNIFICANT CHANGE UP (ref 43–77)
NEUTROPHILS NFR BLD AUTO: 75.7 % — SIGNIFICANT CHANGE UP (ref 43–77)
NRBC # BLD: 0 /100 WBCS — SIGNIFICANT CHANGE UP (ref 0–0)
NRBC # BLD: 0 /100 WBCS — SIGNIFICANT CHANGE UP (ref 0–0)
NRBC # FLD: 0 K/UL — SIGNIFICANT CHANGE UP (ref 0–0)
NRBC # FLD: 0 K/UL — SIGNIFICANT CHANGE UP (ref 0–0)
PLATELET # BLD AUTO: 276 K/UL — SIGNIFICANT CHANGE UP (ref 150–400)
PLATELET # BLD AUTO: 276 K/UL — SIGNIFICANT CHANGE UP (ref 150–400)
POTASSIUM SERPL-MCNC: 3.5 MMOL/L — SIGNIFICANT CHANGE UP (ref 3.5–5.3)
POTASSIUM SERPL-MCNC: 3.5 MMOL/L — SIGNIFICANT CHANGE UP (ref 3.5–5.3)
POTASSIUM SERPL-SCNC: 3.5 MMOL/L — SIGNIFICANT CHANGE UP (ref 3.5–5.3)
POTASSIUM SERPL-SCNC: 3.5 MMOL/L — SIGNIFICANT CHANGE UP (ref 3.5–5.3)
PROT SERPL-MCNC: 7.4 G/DL — SIGNIFICANT CHANGE UP (ref 6–8.3)
PROT SERPL-MCNC: 7.4 G/DL — SIGNIFICANT CHANGE UP (ref 6–8.3)
PROTHROM AB SERPL-ACNC: 10.4 SEC — SIGNIFICANT CHANGE UP (ref 9.5–13)
PROTHROM AB SERPL-ACNC: 10.4 SEC — SIGNIFICANT CHANGE UP (ref 9.5–13)
RBC # BLD: 4.44 M/UL — SIGNIFICANT CHANGE UP (ref 3.8–5.2)
RBC # BLD: 4.44 M/UL — SIGNIFICANT CHANGE UP (ref 3.8–5.2)
RBC # FLD: 13.8 % — SIGNIFICANT CHANGE UP (ref 10.3–14.5)
RBC # FLD: 13.8 % — SIGNIFICANT CHANGE UP (ref 10.3–14.5)
RH IG SCN BLD-IMP: POSITIVE — SIGNIFICANT CHANGE UP
SODIUM SERPL-SCNC: 138 MMOL/L — SIGNIFICANT CHANGE UP (ref 135–145)
SODIUM SERPL-SCNC: 138 MMOL/L — SIGNIFICANT CHANGE UP (ref 135–145)
WBC # BLD: 9.96 K/UL — SIGNIFICANT CHANGE UP (ref 3.8–10.5)
WBC # BLD: 9.96 K/UL — SIGNIFICANT CHANGE UP (ref 3.8–10.5)
WBC # FLD AUTO: 9.96 K/UL — SIGNIFICANT CHANGE UP (ref 3.8–10.5)
WBC # FLD AUTO: 9.96 K/UL — SIGNIFICANT CHANGE UP (ref 3.8–10.5)

## 2023-10-20 PROCEDURE — G0463: CPT

## 2023-10-20 PROCEDURE — 76815 OB US LIMITED FETUS(S): CPT | Mod: 26

## 2023-10-20 PROCEDURE — 99213 OFFICE O/P EST LOW 20 MIN: CPT | Mod: TH

## 2023-10-20 PROCEDURE — 81003 URINALYSIS AUTO W/O SCOPE: CPT

## 2023-10-20 PROCEDURE — 99212 OFFICE O/P EST SF 10 MIN: CPT | Mod: TH,95

## 2023-10-20 RX ORDER — MIFEPRISTONE 300 MG/1
200 TABLET, FILM COATED ORAL ONCE
Refills: 0 | Status: COMPLETED | OUTPATIENT
Start: 2023-10-20 | End: 2023-10-20

## 2023-10-20 RX ORDER — SODIUM CHLORIDE 9 MG/ML
1000 INJECTION, SOLUTION INTRAVENOUS
Refills: 0 | Status: DISCONTINUED | OUTPATIENT
Start: 2023-10-20 | End: 2023-10-22

## 2023-10-20 RX ORDER — INFLUENZA VIRUS VACCINE 15; 15; 15; 15 UG/.5ML; UG/.5ML; UG/.5ML; UG/.5ML
0.5 SUSPENSION INTRAMUSCULAR ONCE
Refills: 0 | Status: DISCONTINUED | OUTPATIENT
Start: 2023-10-20 | End: 2023-10-22

## 2023-10-20 RX ORDER — SODIUM CHLORIDE 9 MG/ML
1000 INJECTION INTRAMUSCULAR; INTRAVENOUS; SUBCUTANEOUS
Refills: 0 | Status: DISCONTINUED | OUTPATIENT
Start: 2023-10-20 | End: 2023-10-22

## 2023-10-20 RX ADMIN — SODIUM CHLORIDE 125 MILLILITER(S): 9 INJECTION, SOLUTION INTRAVENOUS at 23:01

## 2023-10-20 RX ADMIN — MIFEPRISTONE 200 MILLIGRAM(S): 300 TABLET, FILM COATED ORAL at 23:15

## 2023-10-20 NOTE — OB RN PATIENT PROFILE - NSSDOHHEADEN_OBGYN_A_OB
PROCEDURE NOTE: Punctal Plugs Bilateral Lower Lids. Diagnosis: Dry Eye Syndrome. Informed consent was obtained. Size/location of plugs inserted: hydrogel. Patient tolerated procedure without complication or difficulty. Andrew Aguilar
no

## 2023-10-20 NOTE — OB PROVIDER H&P - HISTORY OF PRESENT ILLNESS
[place] interpretor  # [id number], Katie  37 y/o  @ 20.6 wks gestation presents with c/o PPROM @ 1400 , pt reports leaking large amount clear fluid denies any uc's or vb denies any n/v/d denies any fever or chills ap care comp by :  PNC @ [facility] transfer of care to Dr Hernandez   pt diagnosed with adenocarcinoma of cervix ~ 1 wk ago and is [facility] , pt was to be scheduled for cone biopsy and cerclage next week  GDMA2 - Novolin 20 units hs

## 2023-10-20 NOTE — PROGRESS NOTE ADULT - SUBJECTIVE AND OBJECTIVE BOX
MFM Fellow Consult Note      HPI: 38 year old  at 20w6d whose pregnancy is complicated by a history of newly diagnosed cervical adenocarcinoma on cervical biopsies presents to triage with LOF which was ruled in for previable prelabor rupture of membranes.    In review patient had Pap smear result demonstrating ASC-H, HPV18+ in this pregnancy. Subsequent colposcopic biopsy in 9/15/23 endocervical adenocarcinoma, and invasion could not be accurately evaluated; last prior Pap normal 5 years ago per patient. MRI chest/abdomen/pelvis performed as part of evaluation in 10/5/2023 demonstrated no evidence of intrathoracic metastasis, with nonspecific heterogeneous appearance of the cervix at the level of the cervical os possibly reflective of the patient's cervical malignancy. No pelvic lymphadenopathy. Her HIV testing resulted negative. Recommendation as per Gyn Oncology team is to proceed with cervical conization.    Conization was planned for this coming monday with cerclage placement however pt now PPROM. Undergoing consultation with gyn onc and family planning pending at this time of this note.    Histories  2018  36 week IOL oligo  GYN: +HPV, Cervical Ca  Med: as above  Surg: Lap adalberto 2018, ex-lap benign tumor removal in [place] (unknown pathology)  Allergies: Seafood intolerance  Meds: PNV, bASA NPH 12u qhs vit d    Physical Exam  ICU Vital Signs Last 24 Hrs  T(C): 36.8 (20 Oct 2023 15:29), Max: 36.8 (20 Oct 2023 15:10)  T(F): 98.24 (20 Oct 2023 15:29), Max: 98.24 (20 Oct 2023 15:29)  HR: 76 (20 Oct 2023 16:30) (76 - 88)  BP: 119/57 (20 Oct 2023 16:30) (109/65 - 124/60)  BP(mean): --  ABP: --  ABP(mean): --  RR: 16 (20 Oct 2023 15:29) (16 - 16)  SpO2: --  Gen: tearful, anxious

## 2023-10-20 NOTE — OB RN TRIAGE NOTE - CURRENT PREGNANCY COMPLICATIONS, OB PROFILE
cervical cancer. sched for cone bx and cerclage/Other cervical cancer. sched for cone bx and cerclage/Gestational Diabetes/Gestational Age less than 36 Weeks/Other

## 2023-10-20 NOTE — OB PROVIDER TRIAGE NOTE - NSOBPROVIDERNOTE_OBGYN_ALL_OB_FT
20.6 wks gestation presents with PPROM :  plan of care d/w dr Coe / DR Mendoza   PPROM @ 20.6 wks gestation with adenocarcinoma of the cervix   Dr Coe in to d/w pt plan of care   Dr Coe d/w Dr Bill gyn oncologist to consult on patient   awaiting gyn - oncology consult   pt to be admitted 20.6 wks gestation presents with PPROM :  plan of care d/w dr Coe / DR Avitia   PPROM @ 20.6 wks gestation with adenocarcinoma of the cervix   Dr Coe in to d/w pt plan of care   Dr Coe d/w Dr Bill gyn oncologist to consult on patient   awaiting gyn - oncology consult   pt to be admitted

## 2023-10-20 NOTE — OB PROVIDER H&P - ASSESSMENT
20.6 wks gestation presents with PPROM :  plan of care d/w dr Coe / DR Avitia  PPROM @ 20.6 wks gestation with adenocarcinoma of the cervix   Dr Coe in to d/w pt plan of care   Dr Coe d/w Dr Bill gyn oncologist to consult on patient   awaiting gyn - oncology consult   pt to be admitted      20.6 wks gestation presents with PPROM :  plan of care d/w dr Coe / DR Avitia  PPROM @ 20.6 wks gestation with adenocarcinoma of the cervix   Dr Coe in to d/w pt plan of care   Dr Coe d/w Dr Bill gyn oncologist to consult on patient   awaiting gyn - oncology consult   pt to be admitted       R3 Addendum 10/20@4p:   Patient presenting with previable PPROM @20w6d with current endocervical adenocarcinoma on cervical biopsy, following with Dr. Diaz.      [id number]  At bedside to discuss options with patient and . Discussed that ROM at this gestational age is consistent with a pregnancy loss. Patient counseled on options for management of fetal demise in the setting of previable PPROM including induction of labor versus dilation and evacuation. However, given the history of cervical cancer discussed that these options may not be possible or recommended. Discussed plan to have GYN Onc team see patient to further decide on plan for management of previable PPROM. Patient overwhelmed with the recent news. Patient and spouse requesting time to further process.    Saira Coe, PGY3  d/w Dr. Avitia and M team

## 2023-10-20 NOTE — CONSULT NOTE ADULT - SUBJECTIVE AND OBJECTIVE BOX
DOMINGA REYESGALINDO  38y  Female [id number]  -hx obtained via a combination of chart review and     HPI:    37yo  @ 20.6 wga presenting with previable PPROM earlier today at ~2p. Gyn/onc consulted for AIS diagnosed this pregnancy for which she follows w/ Dr. Andujar. Plan had been for MFM consultation and CKC. Pregnancy c/b GDMA2 as aforementioned     Onc Hx:  10/5/2023 MRI of the Chest, Abdomen, and Pelvis demonstrated:  No evidence of intrathoracic metastasis  Heterogeneous appearance of the cervix at the level of the cervical os, nonspecific but may reflect the patients cervical malignancy. No pelvic lymphadenopathy or metastasis.     Name of Ob/Gyn Physician: Dr. Laurie Nielson ([facility])     POB: 36wga  2/2 to Oligohydramnios in 2018  PGyn: Above   MedHx: None  SurgHx: Lsc adalberto, ex-lap w/ benign tumor removal in  in [place]  Meds: PNV, Novolin 20U qHS  Allergies: NKDA  Social: Denies any tobacco use, drug use, or alcohol use     Vital Signs Last 24 Hrs  T(C): 36.8 (20 Oct 2023 15:29), Max: 36.8 (20 Oct 2023 15:10)  T(F): 98.24 (20 Oct 2023 15:29), Max: 98.24 (20 Oct 2023 15:29)  HR: 76 (20 Oct 2023 16:30) (76 - 88)  BP: 119/57 (20 Oct 2023 16:30) (109/65 - 124/60)  BP(mean): --  RR: 16 (20 Oct 2023 15:29) (16 - 16)  SpO2: --        Physical Exam:     LABS:              RADIOLOGY & ADDITIONAL STUDIES: DOMINGA REYESGALINDO  38y  Female [id number]  -hx obtained via a combination of chart review and ***    HPI:    37yo  @ 20.6 wga presenting with previable PPROM earlier today at ~2p. Gyn/onc consulted for AIS diagnosed this pregnancy for which she follows w/ Dr. Andujar. Plan had been for MFM consultation and CKC. Pregnancy c/b GDMA2 as aforementioned     Onc Hx:  10/5/2023 MRI of the Chest, Abdomen, and Pelvis demonstrated:  No evidence of intrathoracic metastasis  Heterogeneous appearance of the cervix at the level of the cervical os, nonspecific but may reflect the patients cervical malignancy. No pelvic lymphadenopathy or metastasis.     Name of Ob/Gyn Physician: Dr. Laurie Nielson ([facility])     POB: 36wga  2/2 to Oligohydramnios in 2018  PGyn: Above   MedHx: None  SurgHx: Lsc adalberto, ex-lap w/ benign tumor removal in  in [place]  Meds: PNV, Novolin 20U qHS  Allergies: NKDA  Social: Denies any tobacco use, drug use, or alcohol use     Vital Signs Last 24 Hrs  T(C): 36.8 (20 Oct 2023 15:29), Max: 36.8 (20 Oct 2023 15:10)  T(F): 98.24 (20 Oct 2023 15:29), Max: 98.24 (20 Oct 2023 15:29)  HR: 76 (20 Oct 2023 16:30) (76 - 88)  BP: 119/57 (20 Oct 2023 16:30) (109/65 - 124/60)  BP(mean): --  RR: 16 (20 Oct 2023 15:29) (16 - 16)  SpO2: --        Physical Exam:    DOMINGA REYESGALINDO  38y  Female [id number]  -hx obtained via a combination of chart review     HPI:    39yo  @ 20.6 wga presenting with previable PPROM earlier today at ~2p. Gyn/onc consulted for AIS diagnosed this pregnancy for which she follows w/ Dr. Andujar. Plan had been for MFM re-evaluation and CKC. Pregnancy c/b GDMA2 as aforementioned     AIS Hx:    10/5/2023 MRI of the Chest, Abdomen, and Pelvis demonstrated:  No evidence of intrathoracic metastasis  Heterogeneous appearance of the cervix at the level of the cervical os, nonspecific but may reflect the patients cervical malignancy. No pelvic lymphadenopathy or metastasis.     10/19/2023 MFM consult w/ respect to mode of delivery:   "Delivery mode: Retrospective and case-controlled studies suggest that vaginal delivery through a cervix with microscopic cervical cancer generally does not alter maternal prognosis. Therefore, women with stage   IA1 and IA2 cervical cancer can typically proceed with a vaginal delivery, with  delivery reserved for standard obstetrical indications. For women with stage IB1 or greater cervical cancer, vaginal delivery should   be avoided due to increased risk of hemorrhage andpossible cancer progression. Of note, the limited studies in this realm are largely based on squamous cell pathology, and data regarding optimal delivery management in the setting of adenocarcinoma, specifically, is lacking."    Name of Ob/Gyn Physician: Dr. Laurie Nielson ([facility])     POB: 36wga  2/2 to Oligohydramnios in 2018  PGyn: Above   MedHx: None  SurgHx: Lsc adalberto, ex-lap w/ benign tumor removal in  in [place]  Meds: PNV, Novolin 20U qHS  Allergies: NKDA  Social: Denies any tobacco use, drug use, or alcohol use     Vital Signs Last 24 Hrs  T(C): 36.8 (20 Oct 2023 15:29), Max: 36.8 (20 Oct 2023 15:10)  T(F): 98.24 (20 Oct 2023 15:29), Max: 98.24 (20 Oct 2023 15:29)  HR: 76 (20 Oct 2023 16:30) (76 - 88)  BP: 119/57 (20 Oct 2023 16:30) (109/65 - 124/60)  BP(mean): --  RR: 16 (20 Oct 2023 15:29) (16 - 16)  SpO2: --        Physical Exam:   Deferred pending multidisciplinary meeting w/ Gyn/Onc attendings and MFM   DOMINGA REYESGALINDO  38y  Female [id number]  -hx obtained via chart review given clinical circumstances    HPI:    39yo  @ 20.6 wga presenting with previable PPROM earlier today at ~2p. Gyn/onc consulted for AIS diagnosed this pregnancy for which she follows w/ Dr. Andujar. Plan had been for MFM re-evaluation and CKC. Pregnancy c/b GDMA2 as aforementioned     AIS Hx:    10/5/2023 MRI of the Chest, Abdomen, and Pelvis demonstrated:  No evidence of intrathoracic metastasis  Heterogeneous appearance of the cervix at the level of the cervical os, nonspecific but may reflect the patients cervical malignancy. No pelvic lymphadenopathy or metastasis.     10/19/2023 MFM consult w/ respect to mode of delivery:   "Delivery mode: Retrospective and case-controlled studies suggest that vaginal delivery through a cervix with microscopic cervical cancer generally does not alter maternal prognosis. Therefore, women with stage IA1 and IA2 cervical cancer can typically proceed with a vaginal delivery, with  delivery reserved for standard obstetrical indications. For women with stage IB1 or greater cervical cancer, vaginal delivery should be avoided due to increased risk of hemorrhage and possible cancer progression. Of note, the limited studies in this realm are largely based on squamous cell pathology, and data regarding optimal delivery management in the setting of adenocarcinoma, specifically, is lacking."    Name of Ob/Gyn Physician: Dr. Laurie Nielson ([facility])     POB: 36wga  2/2 to Oligohydramnios in 2018  PGyn: Above   MedHx: None  SurgHx: Lsc adalberto, ex-lap w/ benign tumor removal in  in [place]  Meds: PNV, Novolin 20U qHS  Allergies: NKDA  Social: Denies any tobacco use, drug use, or alcohol use     Vital Signs Last 24 Hrs  T(C): 36.8 (20 Oct 2023 15:29), Max: 36.8 (20 Oct 2023 15:10)  T(F): 98.24 (20 Oct 2023 15:29), Max: 98.24 (20 Oct 2023 15:29)  HR: 76 (20 Oct 2023 16:30) (76 - 88)  BP: 119/57 (20 Oct 2023 16:30) (109/65 - 124/60)  BP(mean): --  RR: 16 (20 Oct 2023 15:29) (16 - 16)  SpO2: --        Physical Exam:   Deferred pending multidisciplinary meeting w/ Gyn/Onc attendings and MFM. Patient noted by antepartum resident, SUHAS Coe (PGY3) to be distraught given her circumstances and had asked for further time to process    DOMINGA REYESGALINDO  38y  Female [id number]  -hx obtained via chart review given clinical circumstances    HPI:    37yo  @ 20.6 wga presenting with previable PPROM earlier today at ~2p. Gyn/onc consulted for at approximately 15 weeks pregnant, for ASC-H; +HPV18 at which time colposcopy with biopsy proven endocervical adenocarcinoma. The patient has several outpatient visits with Dr. Andujar regarding management plan of CKC and cerclage (jointly with M, Dr. Hernandez following) which was recommended at the interdisciplinary TB meeting. The patient was initially hesitant to have any procedural intervention done prior to viability (24 weeks) but decided today to proceed with tentative plan for this procedure to be 10/23. Shortly after her vteb with Dr. Andujar she called her office reporting LOF and concern that she had "broke her water". She was advised to seek evaluation at [facility].  Pregnancy c/b GDMA2.          10/5/2023 MRI of the Chest, Abdomen, and Pelvis demonstrated:  No evidence of intrathoracic metastasis  Heterogeneous appearance of the cervix at the level of the cervical os, nonspecific but may reflect the patients cervical malignancy. No pelvic lymphadenopathy or metastasis.     10/19/2023 [facility] consult w/ respect to mode of delivery:   "Delivery mode: Retrospective and case-controlled studies suggest that vaginal delivery through a cervix with microscopic cervical cancer generally does not alter maternal prognosis. Therefore, women with stage IA1 and IA2 cervical cancer can typically proceed with a vaginal delivery, with  delivery reserved for standard obstetrical indications. For women with stage IB1 or greater cervical cancer, vaginal delivery should be avoided due to increased risk of hemorrhage and possible cancer progression. Of note, the limited studies in this realm are largely based on squamous cell pathology, and data regarding optimal delivery management in the setting of adenocarcinoma, specifically, is lacking."    Name of Ob/Gyn Physician: Dr. Laurie Nielson ([facility])     POB: 36wga  2/2 to Oligohydramnios in 2018  PGyn: Above   MedHx: None  SurgHx: Lsc adalberto, ex-lap w/ benign tumor removal in  in [place]  Meds: PNV, Novolin 20U qHS  Allergies: NKDA  Social: Denies any tobacco use, drug use, or alcohol use     Vital Signs Last 24 Hrs  T(C): 36.8 (20 Oct 2023 15:29), Max: 36.8 (20 Oct 2023 15:10)  T(F): 98.24 (20 Oct 2023 15:29), Max: 98.24 (20 Oct 2023 15:29)  HR: 76 (20 Oct 2023 16:30) (76 - 88)  BP: 119/57 (20 Oct 2023 16:30) (109/65 - 124/60)  BP(mean): --  RR: 16 (20 Oct 2023 15:29) (16 - 16)  SpO2: --        Physical Exam:   Deferred pending multidisciplinary meeting w/ Gyn/Onc attendings and MFM. Patient noted by antepartum residentSUHAS (PGY3) to be distraught given her circumstances and had asked for further time to process

## 2023-10-20 NOTE — OB PROVIDER TRIAGE NOTE - NS_TRIAGEMEDICALSCREENINGPERFORMEDBY_OBGYN_ALL_OB_FT
[de-identified] : Ms. KRISTINE VENEGAS  is a 68 year old female who presents with some left-sided back pain and was told she has a compression fx.  Denies any LE radicular symptoms.  Normal bowel and bladder control.   Denies any recent fevers, chills, sweats, weight loss, or infection.\par \par The patients past medical history, past surgical history, medications, allergies, and social history were reviewed by me today with the patient and documented accordingly.  In addition, the patient's family history, which is noncontributory to their visit, was also reviewed.\par 
michelle
Winlevi Counseling:  I discussed with the patient the risks of topical clascoterone including but not limited to erythema, scaling, itching, and stinging. Patient voiced their understanding.

## 2023-10-20 NOTE — OB PROVIDER TRIAGE NOTE - CURRENT PREGNANCY COMPLICATIONS, OB PROFILE
cervical cancer. sched for cone bx and cerclage/Gestational Diabetes/Gestational Age less than 36 Weeks/Other

## 2023-10-20 NOTE — PROGRESS NOTE ADULT - ASSESSMENT
A/P: 38 year old  at 20w6d whose pregnancy is complicated by a history of newly diagnosed cervical adenocarcinoma on cervical biopsies presents to triage with LOF which was ruled in for previable prelabor rupture of membranes. VSS. Symptomatic as described above. Given finding of previable PPROM no role for expectant mng and pregnancy should be evacuated. Exact methodology/strategy for termination of pregnancy is questionable given there is no exact diagnosis of her cancer at this time. As mentioned in previous [facility] outpatient consultation note "Retrospective and case-controlled studies suggest that vaginal delivery through a cervix with microscopic cervical cancer generally does not alter maternal prognosis. Therefore, women with stage IA1 and IA2 cervical cancer can typically proceed with a vaginal delivery". Recommend gyn oncology consultation and family planning consultation at this time. Also  made aware of case. We will continue to follow.    Patient seen with Dr. Dumont ([facility] attending)    Satish Jones M.D. FACOG PGY-7  Maternal Fetal Medicine Fellow  Cell: [phone] if after 5pm or weekend ask labor and delivery for on call fellow

## 2023-10-20 NOTE — CHART NOTE - NSCHARTNOTEFT_GEN_A_CORE
Pt seen at bedside with partner. Discussion with assistance of : Eric #[id number]    Discussed with patient her understanding about her clinical situation. Pt understands that she broke her water resulting in a non viable pregnancy.   Pt feels well without fevers, chills, or abdominal pelvic pain. Patient and partner appropriately grieving diagnosis. Pt seen at bedside with partner. Discussion with assistance of : Eric #[id number]    Discussed with patient her understanding about her clinical situation. Pt understands that she broke her water resulting in a non viable pregnancy.   Pt feels well without fevers, chills, or abdominal pelvic pain. Patient and partner appropriately grieving diagnosis.    Discussed with patient about her goals of care. 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   Pt states that she would like to pursue to the option that prioritizes maternal safety and avoid sepsis/ an emergency.   Pt states that she would like to start this process now rather than allowing additional time for a scheduled intervention / time to grieve.   Pt understands that an induction process involves cervical ripening, labor contractions, and the passage of the pregnancy while awake. Pt and partner understand.     In discussion with MFM (Dr. Gregory / Dr. Hirschberg) , Gyn Onc (Dr. Terrie Andujar), and OB safety officers (Dr. Yusuf) on the floor recommendation was made for the induction of labor per standard protcol for FD less than 24 weeks.   Consents signed with patient for IOL, possible D&C, possible D&E.   Blood transfusion consents signed.   Pt would like additional time to consider genetics and burial options. Will continue to follow.     Niru Montes, PGY-3 Pt seen at bedside with partner. Discussion with assistance of : #[id number] / #[id number]    Discussed with patient her understanding about her clinical situation. Pt understands that she broke her water resulting in a non viable pregnancy.   Pt feels well without fevers, chills, or abdominal pelvic pain. Patient and partner appropriately grieving diagnosis.    Discussed with patient about her goals of care. 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   Pt states that she would like to pursue to the option that prioritizes maternal safety and avoid sepsis/ an emergency.   Pt states that she would like to start this process now rather than allowing additional time for a scheduled intervention / time to grieve.   Pt understands that an induction process involves cervical ripening, labor contractions, and the passage of the pregnancy while awake. Pt and partner understand.     In discussion with MFM (Dr. Gregory / Dr. Hirschberg) , Gyn Onc (Dr. Terrie Andujar), Family planning (Dr. Yee) and OB safety officers (Dr. Yusuf) on the floor.   Recommendation was made for the induction of labor per standard protocol for FD less than 24 weeks.   Consents signed with patient for IOL, possible D&C, possible D&E, possible ex-lap, possible hysterectomy   Blood transfusion consents signed.   Pt would like additional time to consider genetics and burial options. Will  follow.     Niru Montes, PGY-3 Pt seen at bedside with partner. Discussion with assistance of : #[id number] / #[id number]    Discussed with patient her understanding about her clinical situation. Pt understands that she broke her water resulting in a non viable pregnancy.   Pt feels well without fevers, chills, or abdominal pelvic pain. Patient and partner appropriately grieving diagnosis.    Discussed with patient about her goals of care. 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   Pt states that she would like to pursue to the option that prioritizes maternal safety and avoid sepsis/ an emergency.   Pt states that she would like to start this process now rather than allowing additional time for a scheduled intervention / time to grieve.   Pt understands that an induction process involves cervical ripening, labor contractions, and the passage of the pregnancy while awake. Pt and partner understand.     In discussion with MFM (Dr. Gregory / Dr. Hirschberg) , Gyn Onc (Dr. Terrie Andujar), Family planning (Dr. Yee) and OB safety officers (Dr. Yusuf) on the floor recommendation was made for the induction of labor per standard protocol for FD less than 24 weeks.   Consents signed with patient for IOL, possible D&C, possible D&E, possible ex-lap, possible hysterectomy   Blood transfusion consents signed. 2 IV placed.   Pt would like additional time to consider genetics and burial options. Will  follow.     Niru Montes, PGY-3

## 2023-10-20 NOTE — OB PROVIDER H&P - PROBLEM SELECTOR PROBLEM 1
premature rupture of membranes (PPROM) with unknown onset of labor Nsaids Counseling: NSAID Counseling: I discussed with the patient that NSAIDs should be taken with food. Prolonged use of NSAIDs can result in the development of stomach ulcers.  Patient advised to stop taking NSAIDs if abdominal pain occurs.  The patient verbalized understanding of the proper use and possible adverse effects of NSAIDs.  All of the patient's questions and concerns were addressed.

## 2023-10-20 NOTE — OB PROVIDER H&P - PROBLEM/PLAN-1
DISPLAY PLAN FREE TEXT Principal Discharge DX:	RSV (respiratory syncytial virus pneumonia)  Secondary Diagnosis:	Respiratory failure requiring intubation  Secondary Diagnosis:	Hyponatremia

## 2023-10-20 NOTE — OB PROVIDER TRIAGE NOTE - NSGENETICTESTING_OBGYN_ALL_OB
[FreeTextEntry1] : 78-year-old female with hypertension controlled with propranolol and lisinopril and hypothyroidism, on Synthroid presents for her yearly physical.\par \par The patient is feeling generally well without any specific complaint. \par \par Patient feeling well without complaints including no chest pain, palpitations, shortness of breath or edema\par Patient is fully independent, drives without issues and is very active.\par \par Patient was diagnosed endometrial cancer in February 2016 for which she had a laparoscopic hysterectomy with Dr. Malvin Lowe with subsequent radiation therapy which she has completed with Dr. Espinosa.Recent followup with both doctors show patient with an OPAL.\par \par History also of mild aortic insufficiency and mitral regurgitation with recent cardiology followup and echocardiogram scheduled in the near future.\par .\par The patient had a vaginal reconstruction surgery secondary to a cystocele with sling procedure June 2018 with good results.\par \par The patient has new diagnosis of basal cell cancer one episode of her head for which she had Mohs surgery and more recently on her nose with planned low dose radiation therapy Yes

## 2023-10-20 NOTE — CONSULT NOTE ADULT - NSCONSULTADDITIONALINFOA_GEN_ALL_CORE
Gynecologic Oncology Attending Addendum    The patient is a 37 yo  at 20w6d gestation now with previable PPROM in setting of recently diagnosed endocervical adenocarcinoma (at least IA1 disease; however, unable to stage given no conization procedure yet performed to determine degree of invasion but MRI C/A/P revealing no bulky tumor nor apparent extracervical spread). Given this clinical circumstance is rare and per her consultation with M "there are only retrospective and case-controlled studies that suggest vaginal delivery through a cervix with microscopic cervical cancer generally does not alter maternal prognosis. Therefore, women with stage IA1 and IA2 cervical cancer can typically proceed with a vaginal delivery, with  delivery reserved for standard obstetrical indications. For women with stage IB1 or greater cervical cancer, vaginal delivery should be avoided due to increased risk of hemorrhage and possible cancer progression. Of note, the limited studies in this realm are largely based on squamous cell pathology, and data regarding optimal delivery management in the setting of adenocarcinoma, specifically, is lacking." As such, given that her endocervical adenocarcinoma appears to be <stage IB1 (albeit not proven yet via tissue diagnosis yet) and there is no bulky tumoral involvement at this time, our recommendation would be to proceed with management of previable PPROM with the typical obstetric options (i.e. expectant management versus medical induction versus D&E) and of course for tis to be in alignment with the patients wishes. Patient may be at increased risk for bleeding given endocervical cancer diagnosis but given tumor burden is clinically thought to be small it should not significantly increase risk of bleeding and standard obstetric measures (both medical, supportive, and mechanical) should be provided to help control any bleeding. Hysterectomy should be reserved only for life saving measures in which hemostasis cannot be obtained otherwise. Gyn Oncology team will round in person tomorrow, provide supportive counseling and be available in the event definitive surgical management with hysterectomy is indicated at any point during her clinical course. However, cervical conization after the immediate postpartum period would ideal after which appropriate surgical definitive surgical planning can be recommended (i.e. extrafascial hysterectomy versus radical hysterectomy).  Appreciate the care and support provided to the patient from the primary obstetric team. Please feel free to reach out to our team with any questions.     Arlene Diaz MD

## 2023-10-20 NOTE — OB PROVIDER TRIAGE NOTE - NSHPPHYSICALEXAM_GEN_ALL_CORE
abdomen: soft, nt on palp  SSE: + pooling  large amount clear odorless amniotic fluid  + nitrazine   + fern   cervix appears FT and posterior   T(C): 36.8 (10-20-23 @ 15:29), Max: 36.8 (10-20-23 @ 15:10)  HR: 79 (10-20-23 @ 15:50) (79 - 88)  BP: 109/65 (10-20-23 @ 15:50) (109/65 - 124/60)  RR: 16 (10-20-23 @ 15:29) (16 - 16)  SpO2: --  images saved in ASOB  report saved in ASOB   OB limited sono :  FH- 159 bpm MVP: 4.7 posterior placenta transverse   toco: no uterine contractions noted

## 2023-10-20 NOTE — CONSULT NOTE ADULT - ASSESSMENT
39yo  @ 20.6 wga presenting with previable PPROM in the setting of cervical AIS 37yo  @ 20.6 wga presenting with previable PPROM in the setting of cervical AIS. Patient initially scheduled to undergo CKC in order to ascertain a diagnosis  37yo  @ 20.6 wga presenting with previable PPROM in the setting of cervical AIS. Patient had been scheduled to undergo CKC in order to determine potential invasive disease. From chart review of prior consultation with MFM, data regarding intrapartum management and delivery with regards to adenocarcinoma of the cervix is lacking    Recommendations  - Pending discussion with outpatient primary gyn/onc Dr. Andujar and [facility] attendings     dEvin Bill  PGY-2, Obstetrics & Gynecology     case d/w Dr. JOSEF Cotter, gyn/onc fellow  39yo  @ 20.6 wga presenting with previable PPROM in the setting of recent biopsy proven endocervical adenocarcinoma. Patient had been scheduled to undergo CKC in order to determine potential invasive disease. From chart review of prior consultation with M, data regarding intrapartum management and delivery with regards to adenocarcinoma of the cervix is lacking.    Recommendations  - Pending discussion with outpatient primary gyn/onc Dr. Andujar and [facility] attendings     Edvin Bill  PGY-2, Obstetrics & Gynecology     case d/w Dr. JOSEF Cotter, gyn/onc fellow

## 2023-10-21 LAB
APPEARANCE UR: ABNORMAL
APPEARANCE UR: ABNORMAL
BACTERIA # UR AUTO: ABNORMAL /HPF
BACTERIA # UR AUTO: ABNORMAL /HPF
BASOPHILS # BLD AUTO: 0.04 K/UL — SIGNIFICANT CHANGE UP (ref 0–0.2)
BASOPHILS # BLD AUTO: 0.04 K/UL — SIGNIFICANT CHANGE UP (ref 0–0.2)
BASOPHILS NFR BLD AUTO: 0.4 % — SIGNIFICANT CHANGE UP (ref 0–2)
BASOPHILS NFR BLD AUTO: 0.4 % — SIGNIFICANT CHANGE UP (ref 0–2)
BILIRUB UR-MCNC: NEGATIVE — SIGNIFICANT CHANGE UP
BILIRUB UR-MCNC: NEGATIVE — SIGNIFICANT CHANGE UP
C TRACH RRNA SPEC QL NAA+PROBE: SIGNIFICANT CHANGE UP
C TRACH RRNA SPEC QL NAA+PROBE: SIGNIFICANT CHANGE UP
CAST: 0 /LPF — SIGNIFICANT CHANGE UP (ref 0–4)
CAST: 0 /LPF — SIGNIFICANT CHANGE UP (ref 0–4)
COLOR SPEC: SIGNIFICANT CHANGE UP
COLOR SPEC: SIGNIFICANT CHANGE UP
DIFF PNL FLD: ABNORMAL
DIFF PNL FLD: ABNORMAL
EOSINOPHIL # BLD AUTO: 0.03 K/UL — SIGNIFICANT CHANGE UP (ref 0–0.5)
EOSINOPHIL # BLD AUTO: 0.03 K/UL — SIGNIFICANT CHANGE UP (ref 0–0.5)
EOSINOPHIL NFR BLD AUTO: 0.3 % — SIGNIFICANT CHANGE UP (ref 0–6)
EOSINOPHIL NFR BLD AUTO: 0.3 % — SIGNIFICANT CHANGE UP (ref 0–6)
GLUCOSE UR QL: NEGATIVE MG/DL — SIGNIFICANT CHANGE UP
GLUCOSE UR QL: NEGATIVE MG/DL — SIGNIFICANT CHANGE UP
HCT VFR BLD CALC: 36.7 % — SIGNIFICANT CHANGE UP (ref 34.5–45)
HCT VFR BLD CALC: 36.7 % — SIGNIFICANT CHANGE UP (ref 34.5–45)
HGB BLD-MCNC: 12.3 G/DL — SIGNIFICANT CHANGE UP (ref 11.5–15.5)
HGB BLD-MCNC: 12.3 G/DL — SIGNIFICANT CHANGE UP (ref 11.5–15.5)
IANC: 8.35 K/UL — HIGH (ref 1.8–7.4)
IANC: 8.35 K/UL — HIGH (ref 1.8–7.4)
IMM GRANULOCYTES NFR BLD AUTO: 1.1 % — HIGH (ref 0–0.9)
IMM GRANULOCYTES NFR BLD AUTO: 1.1 % — HIGH (ref 0–0.9)
KETONES UR-MCNC: 15 MG/DL
KETONES UR-MCNC: 15 MG/DL
LEUKOCYTE ESTERASE UR-ACNC: NEGATIVE — SIGNIFICANT CHANGE UP
LEUKOCYTE ESTERASE UR-ACNC: NEGATIVE — SIGNIFICANT CHANGE UP
LYMPHOCYTES # BLD AUTO: 1.74 K/UL — SIGNIFICANT CHANGE UP (ref 1–3.3)
LYMPHOCYTES # BLD AUTO: 1.74 K/UL — SIGNIFICANT CHANGE UP (ref 1–3.3)
LYMPHOCYTES # BLD AUTO: 15.9 % — SIGNIFICANT CHANGE UP (ref 13–44)
LYMPHOCYTES # BLD AUTO: 15.9 % — SIGNIFICANT CHANGE UP (ref 13–44)
MCHC RBC-ENTMCNC: 28.4 PG — SIGNIFICANT CHANGE UP (ref 27–34)
MCHC RBC-ENTMCNC: 28.4 PG — SIGNIFICANT CHANGE UP (ref 27–34)
MCHC RBC-ENTMCNC: 33.5 GM/DL — SIGNIFICANT CHANGE UP (ref 32–36)
MCHC RBC-ENTMCNC: 33.5 GM/DL — SIGNIFICANT CHANGE UP (ref 32–36)
MCV RBC AUTO: 84.8 FL — SIGNIFICANT CHANGE UP (ref 80–100)
MCV RBC AUTO: 84.8 FL — SIGNIFICANT CHANGE UP (ref 80–100)
MONOCYTES # BLD AUTO: 0.64 K/UL — SIGNIFICANT CHANGE UP (ref 0–0.9)
MONOCYTES # BLD AUTO: 0.64 K/UL — SIGNIFICANT CHANGE UP (ref 0–0.9)
MONOCYTES NFR BLD AUTO: 5.9 % — SIGNIFICANT CHANGE UP (ref 2–14)
MONOCYTES NFR BLD AUTO: 5.9 % — SIGNIFICANT CHANGE UP (ref 2–14)
N GONORRHOEA RRNA SPEC QL NAA+PROBE: SIGNIFICANT CHANGE UP
N GONORRHOEA RRNA SPEC QL NAA+PROBE: SIGNIFICANT CHANGE UP
NEUTROPHILS # BLD AUTO: 8.35 K/UL — HIGH (ref 1.8–7.4)
NEUTROPHILS # BLD AUTO: 8.35 K/UL — HIGH (ref 1.8–7.4)
NEUTROPHILS NFR BLD AUTO: 76.4 % — SIGNIFICANT CHANGE UP (ref 43–77)
NEUTROPHILS NFR BLD AUTO: 76.4 % — SIGNIFICANT CHANGE UP (ref 43–77)
NITRITE UR-MCNC: NEGATIVE — SIGNIFICANT CHANGE UP
NITRITE UR-MCNC: NEGATIVE — SIGNIFICANT CHANGE UP
NRBC # BLD: 0 /100 WBCS — SIGNIFICANT CHANGE UP (ref 0–0)
NRBC # BLD: 0 /100 WBCS — SIGNIFICANT CHANGE UP (ref 0–0)
NRBC # FLD: 0 K/UL — SIGNIFICANT CHANGE UP (ref 0–0)
NRBC # FLD: 0 K/UL — SIGNIFICANT CHANGE UP (ref 0–0)
PH UR: 5.5 — SIGNIFICANT CHANGE UP (ref 5–8)
PH UR: 5.5 — SIGNIFICANT CHANGE UP (ref 5–8)
PLATELET # BLD AUTO: 247 K/UL — SIGNIFICANT CHANGE UP (ref 150–400)
PLATELET # BLD AUTO: 247 K/UL — SIGNIFICANT CHANGE UP (ref 150–400)
PROT UR-MCNC: SIGNIFICANT CHANGE UP MG/DL
PROT UR-MCNC: SIGNIFICANT CHANGE UP MG/DL
RBC # BLD: 4.33 M/UL — SIGNIFICANT CHANGE UP (ref 3.8–5.2)
RBC # BLD: 4.33 M/UL — SIGNIFICANT CHANGE UP (ref 3.8–5.2)
RBC # FLD: 13.9 % — SIGNIFICANT CHANGE UP (ref 10.3–14.5)
RBC # FLD: 13.9 % — SIGNIFICANT CHANGE UP (ref 10.3–14.5)
RBC CASTS # UR COMP ASSIST: 2 /HPF — SIGNIFICANT CHANGE UP (ref 0–4)
RBC CASTS # UR COMP ASSIST: 2 /HPF — SIGNIFICANT CHANGE UP (ref 0–4)
REVIEW: SIGNIFICANT CHANGE UP
REVIEW: SIGNIFICANT CHANGE UP
SP GR SPEC: 1.02 — SIGNIFICANT CHANGE UP (ref 1–1.03)
SP GR SPEC: 1.02 — SIGNIFICANT CHANGE UP (ref 1–1.03)
SQUAMOUS # UR AUTO: 12 /HPF — HIGH (ref 0–5)
SQUAMOUS # UR AUTO: 12 /HPF — HIGH (ref 0–5)
UROBILINOGEN FLD QL: 1 MG/DL — SIGNIFICANT CHANGE UP (ref 0.2–1)
UROBILINOGEN FLD QL: 1 MG/DL — SIGNIFICANT CHANGE UP (ref 0.2–1)
WBC # BLD: 10.92 K/UL — HIGH (ref 3.8–10.5)
WBC # BLD: 10.92 K/UL — HIGH (ref 3.8–10.5)
WBC # FLD AUTO: 10.92 K/UL — HIGH (ref 3.8–10.5)
WBC # FLD AUTO: 10.92 K/UL — HIGH (ref 3.8–10.5)
WBC UR QL: 6 /HPF — HIGH (ref 0–5)
WBC UR QL: 6 /HPF — HIGH (ref 0–5)

## 2023-10-21 PROCEDURE — 88305 TISSUE EXAM BY PATHOLOGIST: CPT | Mod: 26

## 2023-10-21 PROCEDURE — 59409 OBSTETRICAL CARE: CPT | Mod: U7,GC

## 2023-10-21 RX ORDER — OXYTOCIN 10 UNIT/ML
20 VIAL (ML) INJECTION ONCE
Refills: 0 | Status: COMPLETED | OUTPATIENT
Start: 2023-10-21 | End: 2023-10-21

## 2023-10-21 RX ORDER — DIBUCAINE 1 %
1 OINTMENT (GRAM) RECTAL EVERY 6 HOURS
Refills: 0 | Status: DISCONTINUED | OUTPATIENT
Start: 2023-10-21 | End: 2023-10-22

## 2023-10-21 RX ORDER — OXYCODONE HYDROCHLORIDE 5 MG/1
5 TABLET ORAL
Refills: 0 | Status: DISCONTINUED | OUTPATIENT
Start: 2023-10-21 | End: 2023-10-22

## 2023-10-21 RX ORDER — OXYTOCIN 10 UNIT/ML
4 VIAL (ML) INJECTION
Qty: 30 | Refills: 0 | Status: DISCONTINUED | OUTPATIENT
Start: 2023-10-21 | End: 2023-10-21

## 2023-10-21 RX ORDER — ACETAMINOPHEN 500 MG
975 TABLET ORAL
Refills: 0 | Status: DISCONTINUED | OUTPATIENT
Start: 2023-10-21 | End: 2023-10-22

## 2023-10-21 RX ORDER — AER TRAVELER 0.5 G/1
1 SOLUTION RECTAL; TOPICAL EVERY 4 HOURS
Refills: 0 | Status: DISCONTINUED | OUTPATIENT
Start: 2023-10-21 | End: 2023-10-22

## 2023-10-21 RX ORDER — BENZOCAINE 10 %
1 GEL (GRAM) MUCOUS MEMBRANE EVERY 6 HOURS
Refills: 0 | Status: DISCONTINUED | OUTPATIENT
Start: 2023-10-21 | End: 2023-10-22

## 2023-10-21 RX ORDER — OXYTOCIN 10 UNIT/ML
4 VIAL (ML) INJECTION
Qty: 30 | Refills: 0 | Status: DISCONTINUED | OUTPATIENT
Start: 2023-10-21 | End: 2023-10-22

## 2023-10-21 RX ORDER — IBUPROFEN 200 MG
600 TABLET ORAL EVERY 6 HOURS
Refills: 0 | Status: DISCONTINUED | OUTPATIENT
Start: 2023-10-21 | End: 2023-10-22

## 2023-10-21 RX ORDER — KETOROLAC TROMETHAMINE 30 MG/ML
30 SYRINGE (ML) INJECTION ONCE
Refills: 0 | Status: DISCONTINUED | OUTPATIENT
Start: 2023-10-21 | End: 2023-10-22

## 2023-10-21 RX ORDER — PRAMOXINE HYDROCHLORIDE 150 MG/15G
1 AEROSOL, FOAM RECTAL EVERY 4 HOURS
Refills: 0 | Status: DISCONTINUED | OUTPATIENT
Start: 2023-10-21 | End: 2023-10-22

## 2023-10-21 RX ORDER — HYDROCORTISONE 1 %
1 OINTMENT (GRAM) TOPICAL EVERY 6 HOURS
Refills: 0 | Status: DISCONTINUED | OUTPATIENT
Start: 2023-10-21 | End: 2023-10-22

## 2023-10-21 RX ORDER — DIPHENHYDRAMINE HCL 50 MG
25 CAPSULE ORAL EVERY 6 HOURS
Refills: 0 | Status: DISCONTINUED | OUTPATIENT
Start: 2023-10-21 | End: 2023-10-22

## 2023-10-21 RX ORDER — TETANUS TOXOID, REDUCED DIPHTHERIA TOXOID AND ACELLULAR PERTUSSIS VACCINE, ADSORBED 5; 2.5; 8; 8; 2.5 [IU]/.5ML; [IU]/.5ML; UG/.5ML; UG/.5ML; UG/.5ML
0.5 SUSPENSION INTRAMUSCULAR ONCE
Refills: 0 | Status: DISCONTINUED | OUTPATIENT
Start: 2023-10-21 | End: 2023-10-22

## 2023-10-21 RX ORDER — SODIUM CHLORIDE 9 MG/ML
3 INJECTION INTRAMUSCULAR; INTRAVENOUS; SUBCUTANEOUS EVERY 8 HOURS
Refills: 0 | Status: DISCONTINUED | OUTPATIENT
Start: 2023-10-21 | End: 2023-10-22

## 2023-10-21 RX ORDER — SIMETHICONE 80 MG/1
80 TABLET, CHEWABLE ORAL EVERY 4 HOURS
Refills: 0 | Status: DISCONTINUED | OUTPATIENT
Start: 2023-10-21 | End: 2023-10-22

## 2023-10-21 RX ORDER — OXYCODONE HYDROCHLORIDE 5 MG/1
5 TABLET ORAL ONCE
Refills: 0 | Status: DISCONTINUED | OUTPATIENT
Start: 2023-10-21 | End: 2023-10-22

## 2023-10-21 RX ORDER — LANOLIN
1 OINTMENT (GRAM) TOPICAL EVERY 6 HOURS
Refills: 0 | Status: DISCONTINUED | OUTPATIENT
Start: 2023-10-21 | End: 2023-10-22

## 2023-10-21 RX ORDER — OXYTOCIN 10 UNIT/ML
41.67 VIAL (ML) INJECTION
Qty: 20 | Refills: 0 | Status: DISCONTINUED | OUTPATIENT
Start: 2023-10-21 | End: 2023-10-22

## 2023-10-21 RX ORDER — MAGNESIUM HYDROXIDE 400 MG/1
30 TABLET, CHEWABLE ORAL
Refills: 0 | Status: DISCONTINUED | OUTPATIENT
Start: 2023-10-21 | End: 2023-10-22

## 2023-10-21 RX ADMIN — SODIUM CHLORIDE 3 MILLILITER(S): 9 INJECTION INTRAMUSCULAR; INTRAVENOUS; SUBCUTANEOUS at 21:00

## 2023-10-21 RX ADMIN — Medication 4 MILLIUNIT(S)/MIN: at 16:47

## 2023-10-21 RX ADMIN — Medication 20 UNIT(S): at 20:30

## 2023-10-21 RX ADMIN — Medication 125 MILLIUNIT(S)/MIN: at 20:30

## 2023-10-21 NOTE — OB PROVIDER DELIVERY SUMMARY - NSPROVIDERDELIVERYNOTE_OBGYN_ALL_OB_FT
approx 8pm.  Placenta in situ  approx 8pm.  Placenta delivered spontaneously about 10 minutes later

## 2023-10-21 NOTE — OB PROVIDER DELIVERY SUMMARY - NSSELHIDDEN_OBGYN_ALL_OB_FT
[NS_DeliveryAttending1_OBGYN_ALL_OB_FT:[id number]],[NS_DeliveryAssist1_OBGYN_ALL_OB_FT:[id number]=]

## 2023-10-21 NOTE — OB PROVIDER DELIVERY SUMMARY - NSLOWPPHRISK_OBGYN_A_OB
No previous uterine incision/Montero Pregnancy/Less than or equal to 4 previous vaginal births/No known bleeding disorder/No history of postpartum hemorrhage/No other PPH risks indicated

## 2023-10-21 NOTE — OB PROVIDER LABOR PROGRESS NOTE - ASSESSMENT
Previable PPROM here for IOL     #Pre-viable PPROM  - Long discussion with patient and family support regarding cytogenetics. Discussed may be of limited utility as the cause of this pregnancy loss was PPROM, however, can send if the patient would like to have definitive genetic evaluation of the fetus. Patient will consider.   - Discussed burial versus cremation. Due to patient's Taoism beliefs, would prefer cremation. Patient knows she will need to contact a  home.     #IOL   - Patient approaching maximum of cytotec per 24h period  - Discussed need for exam to evaluate next steps for induction of labor   - Patient having increased cramping, offered epidural versus IV pain medication. Patient requesting epidural.   - Anesthesia called for epidural    Saria Coe, PGY3  d/w Dr. Alvarez 
Previable PPROM induction of labor     - Patient s/p loading dose of buccal cytotec and s/p 4 doses of buccal cytotec 400 mcg. Now at 24h maximum of cytotec of 2400mcg.   - Patient is a multip. Plan for pitocin 4x4.   - Pt comfortable with epidural.   - Patient desires cremation. Has contacted  home. Burial forms signed.   - Patient declines autopsy or cytogenetics     Saira Coe, PGY3  d/w Dr. Alvarez

## 2023-10-21 NOTE — CHART NOTE - NSCHARTNOTEFT_GEN_A_CORE
Pt seen with the assistance of a  ID# [id number]    Pt seen at bedside with strong family support with her. Pt states that she is feeling well. Has started to noticed mild abdominal cramping.  Denies fevers / chills.    Pt is considering genetics and private burial but needs to additional time to make her final decision.     Pt clinically well appearing. Afebrile overnight   No contractions seen on toco.   VE: 1/50/-3     Continue with BC 400mcg q3 hours.  Expectations set for patient and family. All questions answered.     Niru Montes, PGY-3

## 2023-10-21 NOTE — CHART NOTE - NSCHARTNOTEFT_GEN_A_CORE
Gyn Onc Attending Brief Note    Patient seen at bedside with her family members and Dr. Cotter, Gyn Onc fellow. She is appropriately coping in this very difficult circumstance. Supportive counseling provided to the patient and family. She was grateful for the visit.    Arlene Diaz MD

## 2023-10-21 NOTE — OB RN DELIVERY SUMMARY - NSSELHIDDEN_OBGYN_ALL_OB_FT
[NS_DeliveryAttending1_OBGYN_ALL_OB_FT:[id number]],[NS_DeliveryAssist1_OBGYN_ALL_OB_FT:[id number]],[NS_DeliveryRN_OBGYN_ALL_OB_FT:[id number]=]

## 2023-10-21 NOTE — PERINATAL/NEONATAL BEREAVEMENT NOTE - NS PERI BEREAVE FOOTPRINTS IN MED
Pharmacist chart review completed for refill of Repatha indicates no medication or significant health changes since last pharmacist counseling. No questions for the pharmacist. Communicated with patient via automated call or text message. Patient's prescription was billed through Medicare Part D. Medication shipped on Monday, September 26th via [employer] to [id number] for delivery in 1-2 business days.     Tori Wylie   [facility]  Phone: [phone]  [email or web address]            yes

## 2023-10-21 NOTE — OB RN DELIVERY SUMMARY - BABY A: WEIGHT IN POUNDS (FROM GRAMS), DELIVERY
4/24/2019         RE: Jaki Berger  [address]  [place] [address]        Dear Colleague,    Thank you for referring your patient, Jaki Berger, to the [facility]. Please see a copy of my visit note below.    SUBJECTIVE:  Jaki Berger is a 66 year old female who is seen in follow-up for right shoulder rotator cuff tear.     Symptoms have improved since last visit.  Feels that physical therapy has helped  Present symptoms:Can move shoulder better.  Less pain.  Can't sleep on that side  Has made adaptations to help function and avoid pain..    Still problems with weakness    Recalls a ripping feeling a year ago into biceps.    Doing therapy/exercises: (y/n): Yes. She has completed 7 sessions    GENERAL: healthy, alert and no distress  GAIT: normal   SKIN: no suspicious lesions or rashes  NEURO: Normal strength and tone, mentation intact and speech normal  VASCULAR:  normal pulses and cappillary refill   PSYCH:  mentation appears normal and affect normal/bright    SHOULDER:    Range of Motion:   Active:forward flexion 125 degrees, internal rotation  L5   Passive: forward flexion normal, external rotation  normal  Strength: forward flexion 3+/5, External rotation 5-/5  Liftoff: Able  Tender: AC and biceps    Impression:   Encounter Diagnosis   Name Primary?     Complete tear of right rotator cuff Yes        PLAN:  Doesn't want to do surgery right now  Continue physical therapy  Discussed pain and functional reasons to do rotator cuff surgery  Return to clinic 3 months or as needed   Total time spent was 25 minutes; with 20 minutes spent face-to-face with patient dedicated to education, counseling and a development of a treatment plan.    AMAURI Davila MD  Dept. Orthopedic Surgery  [facility]                  Again, thank you for allowing me to participate in the care of your patient.        Sincerely,        Zeeshan Davila MD    
0

## 2023-10-21 NOTE — OB RN DELIVERY SUMMARY - AS DELIV COMPLICATIONS OB
premature rupture of membranes prior to labor GDMA2, adenocarcinoma of cervix/premature rupture of membranes prior to labor

## 2023-10-22 ENCOUNTER — TRANSCRIPTION ENCOUNTER (OUTPATIENT)
Age: 38
End: 2023-10-22

## 2023-10-22 VITALS
HEART RATE: 69 BPM | OXYGEN SATURATION: 100 % | SYSTOLIC BLOOD PRESSURE: 117 MMHG | DIASTOLIC BLOOD PRESSURE: 54 MMHG | RESPIRATION RATE: 17 BRPM | TEMPERATURE: 99 F

## 2023-10-22 LAB
HCT VFR BLD CALC: 34.6 % — SIGNIFICANT CHANGE UP (ref 34.5–45)
HCT VFR BLD CALC: 34.6 % — SIGNIFICANT CHANGE UP (ref 34.5–45)
HGB BLD-MCNC: 11.7 G/DL — SIGNIFICANT CHANGE UP (ref 11.5–15.5)
HGB BLD-MCNC: 11.7 G/DL — SIGNIFICANT CHANGE UP (ref 11.5–15.5)
MCHC RBC-ENTMCNC: 28.7 PG — SIGNIFICANT CHANGE UP (ref 27–34)
MCHC RBC-ENTMCNC: 28.7 PG — SIGNIFICANT CHANGE UP (ref 27–34)
MCHC RBC-ENTMCNC: 33.8 GM/DL — SIGNIFICANT CHANGE UP (ref 32–36)
MCHC RBC-ENTMCNC: 33.8 GM/DL — SIGNIFICANT CHANGE UP (ref 32–36)
MCV RBC AUTO: 85 FL — SIGNIFICANT CHANGE UP (ref 80–100)
MCV RBC AUTO: 85 FL — SIGNIFICANT CHANGE UP (ref 80–100)
NRBC # BLD: 0 /100 WBCS — SIGNIFICANT CHANGE UP (ref 0–0)
NRBC # BLD: 0 /100 WBCS — SIGNIFICANT CHANGE UP (ref 0–0)
NRBC # FLD: 0 K/UL — SIGNIFICANT CHANGE UP (ref 0–0)
NRBC # FLD: 0 K/UL — SIGNIFICANT CHANGE UP (ref 0–0)
PLATELET # BLD AUTO: 233 K/UL — SIGNIFICANT CHANGE UP (ref 150–400)
PLATELET # BLD AUTO: 233 K/UL — SIGNIFICANT CHANGE UP (ref 150–400)
RBC # BLD: 4.07 M/UL — SIGNIFICANT CHANGE UP (ref 3.8–5.2)
RBC # BLD: 4.07 M/UL — SIGNIFICANT CHANGE UP (ref 3.8–5.2)
RBC # FLD: 13.6 % — SIGNIFICANT CHANGE UP (ref 10.3–14.5)
RBC # FLD: 13.6 % — SIGNIFICANT CHANGE UP (ref 10.3–14.5)
WBC # BLD: 11.22 K/UL — HIGH (ref 3.8–10.5)
WBC # BLD: 11.22 K/UL — HIGH (ref 3.8–10.5)
WBC # FLD AUTO: 11.22 K/UL — HIGH (ref 3.8–10.5)
WBC # FLD AUTO: 11.22 K/UL — HIGH (ref 3.8–10.5)

## 2023-10-22 PROCEDURE — 99231 SBSQ HOSP IP/OBS SF/LOW 25: CPT

## 2023-10-22 RX ORDER — HEPARIN SODIUM 5000 [USP'U]/ML
5000 INJECTION INTRAVENOUS; SUBCUTANEOUS EVERY 12 HOURS
Refills: 0 | Status: DISCONTINUED | OUTPATIENT
Start: 2023-10-22 | End: 2023-10-22

## 2023-10-22 RX ORDER — IBUPROFEN 200 MG
600 TABLET ORAL
Qty: 0 | Refills: 0 | DISCHARGE
Start: 2023-10-22

## 2023-10-22 RX ORDER — HUMAN INSULIN 100 [IU]/ML
20 INJECTION, SUSPENSION SUBCUTANEOUS
Refills: 0 | DISCHARGE

## 2023-10-22 RX ORDER — ACETAMINOPHEN 500 MG
1000 TABLET ORAL
Qty: 0 | Refills: 0 | DISCHARGE
Start: 2023-10-22

## 2023-10-22 RX ADMIN — Medication 30 MILLIGRAM(S): at 01:32

## 2023-10-22 RX ADMIN — HEPARIN SODIUM 5000 UNIT(S): 5000 INJECTION INTRAVENOUS; SUBCUTANEOUS at 06:23

## 2023-10-22 RX ADMIN — SODIUM CHLORIDE 3 MILLILITER(S): 9 INJECTION INTRAMUSCULAR; INTRAVENOUS; SUBCUTANEOUS at 16:29

## 2023-10-22 RX ADMIN — Medication 30 MILLIGRAM(S): at 00:29

## 2023-10-22 NOTE — DISCHARGE NOTE OB - CARE PLAN
1 Principal Discharge DX:	 premature rupture of membranes (PPROM) with unknown onset of labor  Assessment and plan of treatment:	Pt presented with previable PPROM. History of biopsy confirmed cervical adenocarcinoma, not yet staged. Proceeded with induction of labor. Delivered vaginally uncomplicated both fetus and placenta. Recovered appropriately.  Secondary Diagnosis:	Adenocarcinoma of cervix

## 2023-10-22 NOTE — DISCHARGE NOTE OB - MEDICATION SUMMARY - MEDICATIONS TO TAKE
I will START or STAY ON the medications listed below when I get home from the hospital:    Advil 200 mg oral tablet  -- 600 milligram(s) by mouth every 6 hours  -- Indication: For Pain    Acetaminophen Extra Strength Gelcaps 500 mg  -- 1,000 milligram(s) by mouth every 6 hours  -- Indication: For Pain    Prenatal 1 oral capsule  -- 1 tab(s) by mouth once a day  -- Indication: For Home med

## 2023-10-22 NOTE — DISCHARGE NOTE OB - AVOID SITTING IN ONE POSITION FOR MORE THAN ONE HOUR
[facility]  [facility]    TEL [phone]        FAX [phone]      JESUSITA GRIFFIN MD, [facility]                RAMOS FLOWERS MD [facility]                  SEAN DAVALOS?MD SHARA, [facility]                ABRAHAN GARCIA MD, [facility]                                                        LINNETTE YAP M.D.[facility]                   MD PURA KNOX MD CINDY SWANSON, [facility]                                                GANESH MALOU, FNP-BC                                                ALE ABDI [facility]        DATE OF CONSULTATION: 3/19/2020 10:05    REFERRING PHYSICIAN:   PCP Darrick Mora MD  Electrophysiology MD Nader  Cardiology Eula?MD shara    REASON FOR CONSULT:  SOB    HISTORY OF PRESENT ILLNESS:    78-year-old female admitted with increased shortness of breath and is found to be in recurrent atrial fibrillation.  Now her symptoms are somewhat better when she is at rest.    PAST MEDICAL HISTORY:    Persistent atrial fibrillation s/p DCCV 8/18 s/p ablation 4/19 (+CTI line)   Atypical atrial flutter s/p DCCV 4/19 (post PVI), 9/19 s/p ablation 12/19  s/p Watchman device implant 11/19  Prolonged QT interval (beta-blocker therapy)  Nonischemic cardiomyopathy, EF ~50%, NYHA class I-II symptoms  Intermittent left bundle branch block  Hypertension  Hyperlipidemia  Recurrent hematuria    MEDICATIONS:   Patient History: Home Medications  acetaminophen (Tylenol 325 mg oral tablet) 650 mg = 2 tab, PO, Tablet, q4hr, PRN ,Pain  03/18/2020 18:17  aspirin (aspirin 81 mg oral tablet) 81 mg = 1 tab, PO, Tablet, qAM  03/18/2020 18:17  clopidogrel (clopidogrel 75 mg oral tablet) 75 mg = 1 tab, PO, Tab, qDay  03/18/2020 18:17  hydrochlorothiazide-lisinopril  (hydrochlorothiazide-lisinopril 25 mg-20 mg oral tablet) 2 tab, PO, qDay, Tab  03/18/2020 18:17  metoprolol (Metoprolol Succinate ER 25 mg oral tablet, extended release) 25 mg = 1 tab, PO, ER Tablet, TID  03/18/2020 18:17  multivitamin (Multiple Vitamins oral capsule) 1 cap, PO, qDay, Cap  03/18/2020 18:17  omega-3 polyunsaturated fatty acids (Omega-3 oral capsule) 1,000 mg =, PO, qDay  03/18/2020 18:17  SOCIAL HISTORY: Nonsmoker.    ALLERGIES:  Allergies (3) Active Reaction  amLODIPine swellling, rash  statins bone aches  sulfADIAZINE GI upset  FAMILY HISTORY: No premature CAD or sudden cardiac death.  REVIEW OF SYSTEMS:    No weight gain.  No fever or chills.  Otherwise, 10 of 14 systems reviewed are negative.  PHYSICAL EXAMINATION:  Vital Signs (last 24 hrs)_____ Last Charted___________Minimum____________ Maximum____________  Temp    97.9  (MAR 19 07:23) L 97.6 (MAR 18 17:44) 97.9  (MAR 18 22:24)  Heart Rate   H 130 (MAR 19 07:23) H 112 (MAR 18 22:24) H 130 (MAR 18 19:13)  Resp Rate       18  (MAR 19 07:23) 16  (MAR 18 17:45) H 24 (MAR 18 13:40)  SBP    106  (MAR 19 07:23) 106  (MAR 19 07:23) H 151 (MAR 18 15:04)  DBP    73  (MAR 19 07:23) 73  (MAR 19 07:23) H 120 (MAR 18 13:40)    GENERAL: alert and oriented, not in acute distress.  Mucous membranes are moist.  Oropharynx clear.     NECK:  Supple.  No thyromegaly.   HEART: Regular tachycardia S1S2 without murmur.   LUNGS:  Clear to auscultation.   CHEST: Nontender  ABDOMEN:  Soft, NT/ND, BS+  EXTREMITIES:  nl ROM, no cyanosis, no clubbing, no edema.   VASCUALR: femoral +2; radial +2  SKIN:  Pale.   PSYCH:  Normal mood and affect.   NEURO:  Grossly intact bilaterally.  Nonfocal bilaterally.   MUSCULOSKELETAL:  No joint effusion.   LYMPH:  No lymphadenopathy.   EYES:  Anicteric.  LAB/STUDIES:    Personally reviewed.    Labs (Last four charted values)  WBC                  H 11.1 (MAR 19) 10.8 (MAR 18)   Hgb                  13.7 (MAR 19) 14.4 (MAR 18)   Hct                   41 (MAR 19) 44 (MAR 18)   Plt                  218 (MAR 19) 244 (MAR 18)   Na                   141 (MAR 19) 141 (MAR 18)   K                    3.5 (MAR 19) 4.2 (MAR 18)   CO2                  24 (MAR 19) 24 (MAR 18)   Cl                   107 (MAR 19) H 108 (MAR 18)   Cr                   0.88 (MAR 19) 0.83 (MAR 18)   BUN                  19 (MAR 19) 19 (MAR 18)   Glucose              H 102 (MAR 19) H 114 (MAR 18)   Mg                   2.0 (MAR 19)   Ca                   8.7 (MAR 19) 9.4 (MAR 18)   Troponin             0.01 (MAR 18) 0.01 (MAR 18) 0.01 (MAR 18)     EKG: personally reviewed and interpreted which reveals EKG is revealed tachycardia and also atrial fibrillation.  With fast heart rates the bundle branch block which is not new.  CXR: personally reviewed films and interpreted and shows pulmonary edema  Telemetry: personally reviewed and interpreted and reveals tachyarrhythmia with a flutter  Echo: Personally reviewed and interpreted images which show 1/20  1. Normal left ventricular size and systolic function with a normal  estimated ejection fraction.   2. No significant valvular abnormalities.  3. Evidence of residual atrial septal defect on color flow Doppler  imaging.    4. No evidence of intracavitary thrombus.  5. Well-seated left atrial appendage occlusion device in place with no  evidence of thrombus on the device or flow around the device     ASSESSMENT AND PLAN:      77 yo female with complex history of atrial flutter s/p DCCV 4/19, 9/19, ablation 12/19 (anterolateral in front of ASHLEY) and persistent  atrial fibrillations/p DCCV 8/18 s/p ablation 4/19 (+CTI line). She is also s/p successful Wathcman procedure in Novmber 2019 and remains on plavix and asa      Recurrent AF unfortunately she continues to have recurrent AF despite ablations.  At this point in terms of symptoms improvement should be served the best with rhythm control.  Patient is on DAPT with watchman so at this point  will arrange for cardioversion and post cardioversion she should be on anticoagulation for 1 month since the arterial standing despite closed appendage can still result in thromboembolic events.  Eventually I think the plan is either to try some antiarrhythmics or if that was tried in the past and not effective probably AV node ablation with pacing.  For now I am also increasing beta-blockers.  Dr. Dias will follow-up.  We will start patient on apixaban and then after discharge she can just continue apixaban with aspirin clopidogrel can be stopped.    HFpEF        is secondary to tachyarrhythmia at this point I would not initiate heart failure protocol.  Her symptoms were improved with heart rate/rhythm control.  Since chest x-ray shows significant volume overload we will give 1 dose of IV Lasix.    Prolonged QT interval on beta-blocker therapy    HTN      controlled continue current management.    Nonischemic cardiomyopathy          carrying the diagnosis but really this is tachyarrhythmia induced LV dysfunction.      Follow up cardioversion and possible discharge later today  Thank you for allowing me to participate in the care of your patient.  Please do not hesitate to call with any further questions.    SUNDEEP DAVALOS?MD CHLOÉ, FACC   Cardiology & Interventional cardiology   Peripheral Arterial & Venous Disease   Director, [facility] Cardiac Cath Lab    CM - cardiomyopathy, NARAYAN - carotid artery stenosis, DAPT - dual antiplatelet therapy, CCB - calcium vinay blocker, HFpEF - CHF with preserved LVEF, NICM - nonischemic cardiomyopathy, PPM - permament pacemaker, SBP - systolic BP, SHD - stractural heart diseae, TAT -  triple antiocoagulant therapy (DAPT + oral anticoagulant), TTE  - transthoracic echocardiogram, WMA - wall motion abnormalities             Electronically Signed On 03.19.2020 11:28  ___________________________________________________   Phu LOVE, Sundeep Shen              d/w   Phu    Echo with wma and EF 30%, septum and inferior.  likely tachycardia/ takatsubo possibly.  she had angio in 2018. no chest pain.  normal cath in 2018.     will plan on med management for now, CHF protocol and diuresis, re-eval in 6 weeks with echo and if stable, need further eval with cath.            Electronically Signed On 03.19.2020 14:24  ___________________________________________________   Cortez LOVE, Erin GOMES     Statement Selected

## 2023-10-22 NOTE — DISCHARGE NOTE OB - CARE PROVIDERS DIRECT ADDRESSES
[email or web address],DirectAddress_Unknown

## 2023-10-22 NOTE — DISCHARGE NOTE OB - CARE PROVIDER_API CALL
Laurie Nielson  Obstetrics and Gynecology  [address], [address]nd [place] [address] [place] [address]  Phone: [phone]  Fax: [phone]  Follow Up Time:     Arlene Diaz  Obstetrics and Gynecology  [address] [place] [address]  Phone: [phone]  Fax: [phone]  Follow Up Time:

## 2023-10-22 NOTE — DISCHARGE NOTE OB - NS MD DC FALL RISK RISK
For information on Fall & Injury Prevention, visit: [email or web address] OR  [email or web address] OR  https://www.cdc.gov/steadi/patient.html

## 2023-10-22 NOTE — PROGRESS NOTE ADULT - ASSESSMENT
Pt POD #1 post epidural placement for demise. Patient is doing well, eating, urinating, and ambulating out of bed without difficulty. Denies nausea and headache. Pain is tolerable at this time. No residual anesthetic issues or complications noted.    Judith Shaw CRNA

## 2023-10-22 NOTE — PROGRESS NOTE ADULT - SUBJECTIVE AND OBJECTIVE BOX
OB Progress Note:  PPD#1    S: 37yo  PPD#1 s/p  with medical history of endocervical adenocarcinoma. Antepartum course complicated by previable PPROM. Patient feels well. Pain is well controlled. She is tolerating a regular diet and passing flatus. She is voiding spontaneously, and ambulating without difficulty. Denies CP/SOB. Denies lightheadedness/dizziness. Denies N/V.    O:  Vitals:  Vital Signs Last 24 Hrs  T(C): 36.6 (22 Oct 2023 02:10), Max: 37.4 (21 Oct 2023 07:26)  T(F): 97.9 (22 Oct 2023 02:10), Max: 99.32 (21 Oct 2023 07:26)  HR: 74 (22 Oct 2023 02:10) (66 - 99)  BP: 120/50 (22 Oct 2023 02:10) (87/52 - 145/64)  BP(mean): --  RR: 20 (22 Oct 2023 02:10) (15 - 20)  SpO2: 98% (22 Oct 2023 02:10) (92% - 100%)    Parameters below as of 22 Oct 2023 02:10  Patient On (Oxygen Delivery Method): room air        MEDICATIONS  (STANDING):  acetaminophen     Tablet .. 975 milliGRAM(s) Oral <User Schedule>  diphtheria/tetanus/pertussis (acellular) Vaccine (Adacel) 0.5 milliLiter(s) IntraMuscular once  heparin   Injectable 5000 Unit(s) SubCutaneous every 12 hours  ibuprofen  Tablet. 600 milliGRAM(s) Oral every 6 hours  influenza   Vaccine 0.5 milliLiter(s) IntraMuscular once  oxytocin Infusion 41.667 milliUNIT(s)/Min (125 mL/Hr) IV Continuous <Continuous>  oxytocin Infusion. 4 milliUNIT(s)/Min (4 mL/Hr) IV Continuous <Continuous>  prenatal multivitamin 1 Tablet(s) Oral daily  sodium chloride 0.9% lock flush 3 milliLiter(s) IV Push every 8 hours      Labs:  Blood type: O Positive  Rubella IgG: RPR:                           12.3   10.92<H> >-----------< 247    ( 10-21 @ 05:30 )             36.7                        12.7   9.96 >-----------< 276    ( 1020 @ 17:25 )             37.0    10-20-23 @ 17:25      138  |  104  |  7   ----------------------------<  94  3.5   |  22  |  0.42<L>        Ca    9.0      20 Oct 2023 17:25    TPro  7.4  /  Alb  4.1  /  TBili  <0.2  /  DBili  x   /  AST  22  /  ALT  22  /  AlkPhos  75  10-20-23 @ 17:25          Physical Exam:  General: NAD  Abdomen: soft, non-tender, non-distended, fundus firm  Vaginal: Lochia wnl  Extremities: No erythema/edema

## 2023-10-22 NOTE — DISCHARGE NOTE OB - PATIENT PORTAL LINK FT
You can access the FollowMyHealth Patient Portal offered by [facility] by registering at the following website: [email or web address]. By joining [facility]’s FollowMyHealth portal, you will also be able to view your health information using other applications (apps) compatible with our system.

## 2023-10-22 NOTE — DISCHARGE NOTE OB - HOSPITAL COURSE
Pt presented with previable PPROM. History of biopsy confirmed cervical adenocarcinoma, not yet staged. Proceeded with induction of labor. Delivered vaginally uncomplicated both fetus and placenta. Recovered appropriately.

## 2023-10-22 NOTE — PROGRESS NOTE ADULT - ASSESSMENT
A/P: 37yo PPD#1 s/p  c/b previable PPROM.  Patient is stable and doing well post-partum.     #cervical adenocarcinoma  -patient to f/u w GYN Onc for additional treatment    #previable PPROM  -social work to see patient  -demise paperwork filled out    #postpartum  - Pain well controlled, continue current pain regimen  - Increase ambulation, SCDs when not ambulating  -AM CBC  - Continue regular diet    E Leti PGY3 A/P: 39yo PPD#1 s/p  c/b previable PPROM.  Patient is stable and doing well post-partum.     #cervical adenocarcinoma  -patient to f/u w GYN Onc for additional treatment    #previable PPROM  -social work to see patient  -demise paperwork filled out    #postpartum  - Pain well controlled, continue current pain regimen  - Increase ambulation, SCDs when not ambulating  -AM CBC  - Continue regular diet    E Leti PGY3    MFM Fellow Addendum   [place] Int #[id number] Utilized     39yo  PPD1 s/p  for previable PPROM and fetal demise. Patient reports feeling physically well but emotionally very upset. She is hemodynamically stable, with minimal bleeding, no fevers, SOB or chest pain. Patient is ambulating and tolerating a regular diet with pain well controlled. She has a history of cervical biopsy concerning for adenocarcinoma and will arrange follow up with Dr. Andujar.  Patient counseled on options for therapy and connecting with a  outpatient. She knows to reach out as needed. Plan for discharge home today.     Carly Hirschberg, MFM Fellow   Patient seen and examined with CAESAR Cantu Attending

## 2023-10-22 NOTE — PROGRESS NOTE ADULT - ATTENDING COMMENTS
MFM Attending Note    Patient seen during morning bedside rounds.    Patient feeling well and states minimal lochia.  Patient states emotionally saddened by demise but feels supported.  Patient to follow up with Gyn Onc for cervical cancer evaluation/staging.  Patient provided with contact for additional resources and felt supported.    Patient cleared for discharge.

## 2023-10-23 DIAGNOSIS — Z34.90 ENCOUNTER FOR SUPERVISION OF NORMAL PREGNANCY, UNSPECIFIED, UNSPECIFIED TRIMESTER: ICD-10-CM

## 2023-10-23 LAB
CULTURE RESULTS: SIGNIFICANT CHANGE UP
CULTURE RESULTS: SIGNIFICANT CHANGE UP
SPECIMEN SOURCE: SIGNIFICANT CHANGE UP
SPECIMEN SOURCE: SIGNIFICANT CHANGE UP

## 2023-10-25 ENCOUNTER — APPOINTMENT (OUTPATIENT)
Dept: ANTEPARTUM | Facility: CLINIC | Age: 38
End: 2023-10-25

## 2023-10-25 ENCOUNTER — APPOINTMENT (OUTPATIENT)
Dept: GYNECOLOGIC ONCOLOGY | Facility: HOSPITAL | Age: 38
End: 2023-10-25

## 2023-10-26 ENCOUNTER — APPOINTMENT (OUTPATIENT)
Dept: MATERNAL FETAL MEDICINE | Facility: CLINIC | Age: 38
End: 2023-10-26

## 2023-11-07 ENCOUNTER — APPOINTMENT (OUTPATIENT)
Dept: GYNECOLOGIC ONCOLOGY | Facility: CLINIC | Age: 38
End: 2023-11-07
Payer: MEDICAID

## 2023-11-07 ENCOUNTER — APPOINTMENT (OUTPATIENT)
Dept: GYNECOLOGIC ONCOLOGY | Facility: CLINIC | Age: 38
End: 2023-11-07

## 2023-11-07 VITALS
SYSTOLIC BLOOD PRESSURE: 135 MMHG | BODY MASS INDEX: 32.59 KG/M2 | WEIGHT: 166 LBS | DIASTOLIC BLOOD PRESSURE: 87 MMHG | HEIGHT: 60 IN | HEART RATE: 80 BPM

## 2023-11-07 DIAGNOSIS — R21 RASH AND OTHER NONSPECIFIC SKIN ERUPTION: ICD-10-CM

## 2023-11-07 PROCEDURE — 99214 OFFICE O/P EST MOD 30 MIN: CPT

## 2023-11-08 PROBLEM — C53.9 MALIGNANT NEOPLASM OF CERVIX UTERI, UNSPECIFIED: Chronic | Status: ACTIVE | Noted: 2023-10-20

## 2023-11-15 ENCOUNTER — APPOINTMENT (OUTPATIENT)
Dept: NUCLEAR MEDICINE | Facility: CLINIC | Age: 38
End: 2023-11-15
Payer: MEDICAID

## 2023-11-15 PROCEDURE — A9552: CPT

## 2023-11-15 PROCEDURE — 78815 PET IMAGE W/CT SKULL-THIGH: CPT | Mod: PI

## 2023-11-16 ENCOUNTER — NON-APPOINTMENT (OUTPATIENT)
Age: 38
End: 2023-11-16

## 2023-11-17 ENCOUNTER — APPOINTMENT (OUTPATIENT)
Dept: OBGYN | Facility: CLINIC | Age: 38
End: 2023-11-17

## 2023-11-17 ENCOUNTER — OUTPATIENT (OUTPATIENT)
Dept: OUTPATIENT SERVICES | Facility: HOSPITAL | Age: 38
LOS: 1 days | End: 2023-11-17

## 2023-11-17 VITALS
SYSTOLIC BLOOD PRESSURE: 122 MMHG | WEIGHT: 170 LBS | HEART RATE: 73 BPM | RESPIRATION RATE: 18 BRPM | OXYGEN SATURATION: 98 % | DIASTOLIC BLOOD PRESSURE: 79 MMHG | BODY MASS INDEX: 33.38 KG/M2 | TEMPERATURE: 97.3 F | HEIGHT: 60 IN

## 2023-11-17 VITALS
HEIGHT: 59 IN | HEART RATE: 75 BPM | WEIGHT: 169.98 LBS | OXYGEN SATURATION: 98 % | TEMPERATURE: 97 F | DIASTOLIC BLOOD PRESSURE: 84 MMHG | RESPIRATION RATE: 16 BRPM | SYSTOLIC BLOOD PRESSURE: 124 MMHG

## 2023-11-17 DIAGNOSIS — Z90.49 ACQUIRED ABSENCE OF OTHER SPECIFIED PARTS OF DIGESTIVE TRACT: Chronic | ICD-10-CM

## 2023-11-17 DIAGNOSIS — C53.0 MALIGNANT NEOPLASM OF ENDOCERVIX: ICD-10-CM

## 2023-11-17 LAB
ANION GAP SERPL CALC-SCNC: 11 MMOL/L — SIGNIFICANT CHANGE UP (ref 7–14)
ANION GAP SERPL CALC-SCNC: 11 MMOL/L — SIGNIFICANT CHANGE UP (ref 7–14)
BUN SERPL-MCNC: 9 MG/DL — SIGNIFICANT CHANGE UP (ref 7–23)
BUN SERPL-MCNC: 9 MG/DL — SIGNIFICANT CHANGE UP (ref 7–23)
CALCIUM SERPL-MCNC: 9.3 MG/DL — SIGNIFICANT CHANGE UP (ref 8.4–10.5)
CALCIUM SERPL-MCNC: 9.3 MG/DL — SIGNIFICANT CHANGE UP (ref 8.4–10.5)
CHLORIDE SERPL-SCNC: 102 MMOL/L — SIGNIFICANT CHANGE UP (ref 98–107)
CHLORIDE SERPL-SCNC: 102 MMOL/L — SIGNIFICANT CHANGE UP (ref 98–107)
CO2 SERPL-SCNC: 26 MMOL/L — SIGNIFICANT CHANGE UP (ref 22–31)
CO2 SERPL-SCNC: 26 MMOL/L — SIGNIFICANT CHANGE UP (ref 22–31)
CREAT SERPL-MCNC: 0.43 MG/DL — LOW (ref 0.5–1.3)
CREAT SERPL-MCNC: 0.43 MG/DL — LOW (ref 0.5–1.3)
EGFR: 128 ML/MIN/1.73M2 — SIGNIFICANT CHANGE UP
EGFR: 128 ML/MIN/1.73M2 — SIGNIFICANT CHANGE UP
GLUCOSE SERPL-MCNC: 118 MG/DL — HIGH (ref 70–99)
GLUCOSE SERPL-MCNC: 118 MG/DL — HIGH (ref 70–99)
HCT VFR BLD CALC: 41.7 % — SIGNIFICANT CHANGE UP (ref 34.5–45)
HCT VFR BLD CALC: 41.7 % — SIGNIFICANT CHANGE UP (ref 34.5–45)
HGB BLD-MCNC: 13.6 G/DL — SIGNIFICANT CHANGE UP (ref 11.5–15.5)
HGB BLD-MCNC: 13.6 G/DL — SIGNIFICANT CHANGE UP (ref 11.5–15.5)
MCHC RBC-ENTMCNC: 27.8 PG — SIGNIFICANT CHANGE UP (ref 27–34)
MCHC RBC-ENTMCNC: 27.8 PG — SIGNIFICANT CHANGE UP (ref 27–34)
MCHC RBC-ENTMCNC: 32.6 GM/DL — SIGNIFICANT CHANGE UP (ref 32–36)
MCHC RBC-ENTMCNC: 32.6 GM/DL — SIGNIFICANT CHANGE UP (ref 32–36)
MCV RBC AUTO: 85.1 FL — SIGNIFICANT CHANGE UP (ref 80–100)
MCV RBC AUTO: 85.1 FL — SIGNIFICANT CHANGE UP (ref 80–100)
NRBC # BLD: 0 /100 WBCS — SIGNIFICANT CHANGE UP (ref 0–0)
NRBC # BLD: 0 /100 WBCS — SIGNIFICANT CHANGE UP (ref 0–0)
NRBC # FLD: 0 K/UL — SIGNIFICANT CHANGE UP (ref 0–0)
NRBC # FLD: 0 K/UL — SIGNIFICANT CHANGE UP (ref 0–0)
PLATELET # BLD AUTO: 362 K/UL — SIGNIFICANT CHANGE UP (ref 150–400)
PLATELET # BLD AUTO: 362 K/UL — SIGNIFICANT CHANGE UP (ref 150–400)
POTASSIUM SERPL-MCNC: 3.8 MMOL/L — SIGNIFICANT CHANGE UP (ref 3.5–5.3)
POTASSIUM SERPL-MCNC: 3.8 MMOL/L — SIGNIFICANT CHANGE UP (ref 3.5–5.3)
POTASSIUM SERPL-SCNC: 3.8 MMOL/L — SIGNIFICANT CHANGE UP (ref 3.5–5.3)
POTASSIUM SERPL-SCNC: 3.8 MMOL/L — SIGNIFICANT CHANGE UP (ref 3.5–5.3)
RBC # BLD: 4.9 M/UL — SIGNIFICANT CHANGE UP (ref 3.8–5.2)
RBC # BLD: 4.9 M/UL — SIGNIFICANT CHANGE UP (ref 3.8–5.2)
RBC # FLD: 12.5 % — SIGNIFICANT CHANGE UP (ref 10.3–14.5)
RBC # FLD: 12.5 % — SIGNIFICANT CHANGE UP (ref 10.3–14.5)
SODIUM SERPL-SCNC: 139 MMOL/L — SIGNIFICANT CHANGE UP (ref 135–145)
SODIUM SERPL-SCNC: 139 MMOL/L — SIGNIFICANT CHANGE UP (ref 135–145)
WBC # BLD: 7.64 K/UL — SIGNIFICANT CHANGE UP (ref 3.8–10.5)
WBC # BLD: 7.64 K/UL — SIGNIFICANT CHANGE UP (ref 3.8–10.5)
WBC # FLD AUTO: 7.64 K/UL — SIGNIFICANT CHANGE UP (ref 3.8–10.5)
WBC # FLD AUTO: 7.64 K/UL — SIGNIFICANT CHANGE UP (ref 3.8–10.5)

## 2023-11-17 RX ORDER — SODIUM CHLORIDE 9 MG/ML
1000 INJECTION, SOLUTION INTRAVENOUS
Refills: 0 | Status: DISCONTINUED | OUTPATIENT
Start: 2023-11-22 | End: 2023-12-06

## 2023-11-17 NOTE — H&P PST ADULT - HISTORY OF PRESENT ILLNESS
37 yo female with preop dx. malignant neoplasm of endocervix scheduled for cold knife cone biopsy.        Of note pt s/p  on 10/21/23 at 21 weeks gestation, previable PPROM.

## 2023-11-17 NOTE — H&P PST ADULT - NSICDXPASTMEDICALHX_GEN_ALL_CORE_FT
PAST MEDICAL HISTORY:  Bowel obstruction     Cervical cancer     Gestational diabetes     Malignant neoplasm of endocervix

## 2023-11-17 NOTE — H&P PST ADULT - PROBLEM SELECTOR PLAN 1
Pt is scheduled for cold knife cone biopsy on 11/22/23.  Verbal and written pre op instructions reviewed with patient and pt able to verbalize understanding. Pt is scheduled for cold knife cone biopsy on 11/22/23.  Verbal and written pre op instructions reviewed with patient and pt able to verbalize understanding.  Pt 4 weeks postpartum, no HCG or UCG indicated.

## 2023-11-17 NOTE — H&P PST ADULT - ATTENDING COMMENTS
Patient seen in ASU with her , no changes reported. Consent form reviewed including examination by learner, all questions answered. Case reviewed with circulating OR team.    Arlene Diaz MD

## 2023-11-20 PROBLEM — Z71.2 ENCOUNTER TO DISCUSS TEST RESULTS: Status: ACTIVE | Noted: 2023-11-20

## 2023-11-21 ENCOUNTER — APPOINTMENT (OUTPATIENT)
Dept: GYNECOLOGIC ONCOLOGY | Facility: CLINIC | Age: 38
End: 2023-11-21
Payer: MEDICAID

## 2023-11-21 ENCOUNTER — TRANSCRIPTION ENCOUNTER (OUTPATIENT)
Age: 38
End: 2023-11-21

## 2023-11-21 DIAGNOSIS — Z71.2 PERSON CONSULTING FOR EXPLANATION OF EXAMINATION OR TEST FINDINGS: ICD-10-CM

## 2023-11-21 PROCEDURE — 99212 OFFICE O/P EST SF 10 MIN: CPT | Mod: 95

## 2023-11-21 NOTE — ASU PATIENT PROFILE, ADULT - INTERNATIONAL TRAVEL
Pt had an unwitnessed fall at her memory care facility.  Per EMS report, pt was walking in the hallway when she fell; down time was not long at all and per staff at the facility pt had no LOC.  Pt has bruising on left knee with abrasions on both knees, laceration on right side of forehead; bleeding controlled.  Pt is not on blood thinners.  Family in en route to the hospital.   No

## 2023-11-22 ENCOUNTER — APPOINTMENT (OUTPATIENT)
Dept: GYNECOLOGIC ONCOLOGY | Facility: HOSPITAL | Age: 38
End: 2023-11-22

## 2023-11-22 ENCOUNTER — TRANSCRIPTION ENCOUNTER (OUTPATIENT)
Age: 38
End: 2023-11-22

## 2023-11-22 ENCOUNTER — RESULT REVIEW (OUTPATIENT)
Age: 38
End: 2023-11-22

## 2023-11-22 ENCOUNTER — OUTPATIENT (OUTPATIENT)
Dept: OUTPATIENT SERVICES | Facility: HOSPITAL | Age: 38
LOS: 1 days | Discharge: HOME | End: 2023-11-22
Payer: MEDICAID

## 2023-11-22 VITALS
DIASTOLIC BLOOD PRESSURE: 68 MMHG | HEART RATE: 74 BPM | SYSTOLIC BLOOD PRESSURE: 135 MMHG | RESPIRATION RATE: 15 BRPM | HEIGHT: 59 IN | WEIGHT: 169.98 LBS | OXYGEN SATURATION: 100 % | TEMPERATURE: 98 F

## 2023-11-22 VITALS
HEART RATE: 81 BPM | OXYGEN SATURATION: 100 % | SYSTOLIC BLOOD PRESSURE: 137 MMHG | DIASTOLIC BLOOD PRESSURE: 63 MMHG | RESPIRATION RATE: 19 BRPM

## 2023-11-22 DIAGNOSIS — Z90.49 ACQUIRED ABSENCE OF OTHER SPECIFIED PARTS OF DIGESTIVE TRACT: Chronic | ICD-10-CM

## 2023-11-22 DIAGNOSIS — C53.0 MALIGNANT NEOPLASM OF ENDOCERVIX: ICD-10-CM

## 2023-11-22 LAB
HCG UR QL: NEGATIVE — SIGNIFICANT CHANGE UP
HCG UR QL: NEGATIVE — SIGNIFICANT CHANGE UP

## 2023-11-22 PROCEDURE — 88360 TUMOR IMMUNOHISTOCHEM/MANUAL: CPT | Mod: 26

## 2023-11-22 PROCEDURE — 57520 CONIZATION OF CERVIX: CPT

## 2023-11-22 PROCEDURE — 88305 TISSUE EXAM BY PATHOLOGIST: CPT | Mod: 26

## 2023-11-22 PROCEDURE — 88307 TISSUE EXAM BY PATHOLOGIST: CPT | Mod: 26

## 2023-11-22 PROCEDURE — 88342 IMHCHEM/IMCYTCHM 1ST ANTB: CPT | Mod: 26,59

## 2023-11-22 PROCEDURE — 88313 SPECIAL STAINS GROUP 2: CPT | Mod: 26

## 2023-11-22 DEVICE — SURGICEL 2 X 14": Type: IMPLANTABLE DEVICE | Status: FUNCTIONAL

## 2023-11-22 RX ORDER — IBUPROFEN 200 MG
1 TABLET ORAL
Qty: 0 | Refills: 0 | DISCHARGE

## 2023-11-22 RX ORDER — HYDROMORPHONE HYDROCHLORIDE 2 MG/ML
0.5 INJECTION INTRAMUSCULAR; INTRAVENOUS; SUBCUTANEOUS
Refills: 0 | Status: DISCONTINUED | OUTPATIENT
Start: 2023-11-22 | End: 2023-11-22

## 2023-11-22 RX ORDER — OXYCODONE HYDROCHLORIDE 5 MG/1
5 TABLET ORAL ONCE
Refills: 0 | Status: DISCONTINUED | OUTPATIENT
Start: 2023-11-22 | End: 2023-11-22

## 2023-11-22 RX ORDER — ONDANSETRON 8 MG/1
4 TABLET, FILM COATED ORAL ONCE
Refills: 0 | Status: DISCONTINUED | OUTPATIENT
Start: 2023-11-22 | End: 2023-12-06

## 2023-11-22 RX ORDER — ACETAMINOPHEN 500 MG
3 TABLET ORAL
Qty: 0 | Refills: 0 | DISCHARGE

## 2023-11-22 RX ADMIN — OXYCODONE HYDROCHLORIDE 5 MILLIGRAM(S): 5 TABLET ORAL at 13:20

## 2023-11-22 RX ADMIN — OXYCODONE HYDROCHLORIDE 5 MILLIGRAM(S): 5 TABLET ORAL at 12:50

## 2023-11-22 NOTE — PACU DISCHARGE NOTE - MENTAL STATUS: PATIENT PARTICIPATION
Awake Detail Level: Simple Render Risk Assessment In Note?: no Comment: Nature/etiology discussed. Hand out provided. Discussed importance of moisturizing daily with creams instead of lotions to maintain skin barrier. Recommend avoiding products with fragrance and lukewarm showers (5-10 minutes), followed by moisturizing. Will start triamcinolone acetonide 0.025 % topical cream  to affected areas BID until clear. Will apply Elidel 1 % topical cream to affected areas of eyelids (clear on exam today, but has a history of eyelid dermatitis). FU yearly Comment: History of sebopsoriasis, mild seb derm on exam today. Nature/etiology discussed with mom. Will start applying Synalar 0.01% topical solution nightly to scalp for 5-7 days and restart if needed. Will wash scalp with  ketoconazole 2 % shampoo for 2-3 weeks.

## 2023-11-22 NOTE — ASU DISCHARGE PLAN (ADULT/PEDIATRIC) - CARE PROVIDER_API CALL
Arlene Diaz  Gynecologic Oncology  [address] [place] [address]  Phone: [phone]  Fax: [phone]  Scheduled Appointment: 12/08/2023

## 2023-11-22 NOTE — ASU DISCHARGE PLAN (ADULT/PEDIATRIC) - NURSING INSTRUCTIONS
Last dose of TYLENOL for pain management was at 12PM. Next dose of TYLENOL may be taken at or after 6PM if needed. DO NOT take any additional products containing TYLENOL or ACETAMINOPHEN, such as VICODIN, PERCOCET, NORCO, EXCEDRIN, and any over-the-counter cold medications. DO NOT CONSUME MORE THAN 9279-7075 MG OF TYLENOL (acetaminophen) in a 24-hour period.

## 2023-11-22 NOTE — ASU DISCHARGE PLAN (ADULT/PEDIATRIC) - MEDICATION INSTRUCTIONS
Alternate taking ibuprofen and Tylenol every 3 hours as needed for pain. Do not take more than 2 doses of the same medication within a 6 hour period

## 2023-11-22 NOTE — ASU DISCHARGE PLAN (ADULT/PEDIATRIC) - NS MD DC FALL RISK RISK
For information on Fall & Injury Prevention, visit: [email or web address] OR  [email or web address] OR  https://www.cdc.gov/steadi/patient.html

## 2023-11-22 NOTE — ASU DISCHARGE PLAN (ADULT/PEDIATRIC) - ASU DC SPECIAL INSTRUCTIONSFT
Expect abdominal cramping/pain and spotting for the next weeks. Take ibuprofen and Tylenol for cramping. Use a pad as needed. Call your physician or go to the emergency room if you experience any of the following: heavy vaginal bleeding (soaking more than 2 pads in 1 hour for 2 hours), fever, chills, nausea, vomiting, or pain that is not controlled by medication. Follow-up with Dr. Andujar in 2 weeks. Postoperative Instructions      Pain control     For pain control, take the followin. Motrin 600mg four times a day (every 6 hours), take with food  2. Add Tylenol 975mg four times a day (every 6 hours), alternated with motrin  Motrin and Tylenol can be obtained over the counter.      Postoperative restrictions   Do not drive or make important decisions for 24 hours after anesthesia. You may feel drowsy for 24 hours and should have a responsible adult with you during that time. Nothing in the vagina (tampons, sexual intercourse), No tub baths, pools or hot tubs until given the okay by Dr. Diaz.      Vaginal bleeding   Spotting per vagina is normal in first few days after surgery. If you are soaking 1 pad per hour, that is not normal and you should notify your doctor's office and seek medical attention right away.        Signs of Infection  Some postoperative pain and discomfort is normal. However, if you experience any of the following, you may be developing an infection and should be seen by your doctor: pain that does not get better with the oral medications listed above, fever greater than 100.4F, foul smelling discharge coming from the surgical site, nausea and vomiting that does not stop (especially if you are unable to tolerate oral intake), or inability to urinate. If you experience any of these symptoms, call your doctor's office to be seen right away.    Follow Up  Please go to your scheduled appointment on 23. The results of the surgery will be discussed with you at this appointment.

## 2023-11-24 PROBLEM — Z00.01 ENCOUNTER FOR WELL ADULT EXAM WITH ABNORMAL FINDINGS: Status: ACTIVE | Noted: 2023-11-24

## 2023-11-24 PROBLEM — C53.0 ENDOCERVICAL ADENOCARCINOMA: Status: RESOLVED | Noted: 2023-11-24 | Resolved: 2023-11-24

## 2023-11-26 ENCOUNTER — NON-APPOINTMENT (OUTPATIENT)
Age: 38
End: 2023-11-26

## 2023-11-30 ENCOUNTER — APPOINTMENT (OUTPATIENT)
Dept: FAMILY MEDICINE | Facility: CLINIC | Age: 38
End: 2023-11-30
Payer: MEDICAID

## 2023-11-30 ENCOUNTER — LABORATORY RESULT (OUTPATIENT)
Age: 38
End: 2023-11-30

## 2023-11-30 VITALS
SYSTOLIC BLOOD PRESSURE: 134 MMHG | HEIGHT: 60 IN | TEMPERATURE: 98 F | BODY MASS INDEX: 33.18 KG/M2 | WEIGHT: 169 LBS | DIASTOLIC BLOOD PRESSURE: 83 MMHG | OXYGEN SATURATION: 99 % | HEART RATE: 66 BPM | RESPIRATION RATE: 18 BRPM

## 2023-11-30 DIAGNOSIS — Z00.01 ENCOUNTER FOR GENERAL ADULT MEDICAL EXAMINATION WITH ABNORMAL FINDINGS: ICD-10-CM

## 2023-11-30 DIAGNOSIS — C53.0 MALIGNANT NEOPLASM OF ENDOCERVIX: ICD-10-CM

## 2023-11-30 DIAGNOSIS — L98.9 DISORDER OF THE SKIN AND SUBCUTANEOUS TISSUE, UNSPECIFIED: ICD-10-CM

## 2023-11-30 PROBLEM — O24.419 GESTATIONAL DIABETES MELLITUS IN PREGNANCY, UNSPECIFIED CONTROL: Chronic | Status: ACTIVE | Noted: 2023-11-17

## 2023-11-30 PROBLEM — K56.609 UNSPECIFIED INTESTINAL OBSTRUCTION, UNSPECIFIED AS TO PARTIAL VERSUS COMPLETE OBSTRUCTION: Chronic | Status: ACTIVE | Noted: 2023-11-17

## 2023-11-30 PROCEDURE — 99385 PREV VISIT NEW AGE 18-39: CPT | Mod: 25

## 2023-12-02 PROBLEM — E78.00 HYPERCHOLESTEROLEMIA: Status: ACTIVE | Noted: 2023-12-02

## 2023-12-02 PROBLEM — R82.90 ABNORMAL URINE: Status: ACTIVE | Noted: 2023-12-02

## 2023-12-02 PROBLEM — E78.6 LOW HDL (UNDER 40): Status: ACTIVE | Noted: 2023-12-02

## 2023-12-04 LAB
25(OH)D3 SERPL-MCNC: 14.8 NG/ML
ALBUMIN SERPL ELPH-MCNC: 4.3 G/DL
ALP BLD-CCNC: 118 U/L
ALT SERPL-CCNC: 28 U/L
ANION GAP SERPL CALC-SCNC: 13 MMOL/L
APPEARANCE: CLEAR
AST SERPL-CCNC: 24 U/L
BASOPHILS # BLD AUTO: 0.03 K/UL
BASOPHILS NFR BLD AUTO: 0.4 %
BILIRUB SERPL-MCNC: 0.2 MG/DL
BILIRUBIN URINE: NEGATIVE
BLOOD URINE: ABNORMAL
BUN SERPL-MCNC: 8 MG/DL
CALCIUM SERPL-MCNC: 9.2 MG/DL
CHLORIDE SERPL-SCNC: 104 MMOL/L
CHOLEST SERPL-MCNC: 187 MG/DL
CO2 SERPL-SCNC: 22 MMOL/L
COLOR: YELLOW
CREAT SERPL-MCNC: 0.46 MG/DL
EGFR: 126 ML/MIN/1.73M2
EOSINOPHIL # BLD AUTO: 0.21 K/UL
EOSINOPHIL NFR BLD AUTO: 2.9 %
ESTIMATED AVERAGE GLUCOSE: 117 MG/DL
GLUCOSE QUALITATIVE U: NEGATIVE MG/DL
GLUCOSE SERPL-MCNC: 153 MG/DL
HBA1C MFR BLD HPLC: 5.7 %
HCT VFR BLD CALC: 40.6 %
HDLC SERPL-MCNC: 34 MG/DL
HGB BLD-MCNC: 13.3 G/DL
IMM GRANULOCYTES NFR BLD AUTO: 1 %
KETONES URINE: NEGATIVE MG/DL
LDLC SERPL CALC-MCNC: 121 MG/DL
LEUKOCYTE ESTERASE URINE: ABNORMAL
LYMPHOCYTES # BLD AUTO: 1.97 K/UL
LYMPHOCYTES NFR BLD AUTO: 26.9 %
MAN DIFF?: NORMAL
MCHC RBC-ENTMCNC: 28.3 PG
MCHC RBC-ENTMCNC: 32.8 GM/DL
MCV RBC AUTO: 86.4 FL
MONOCYTES # BLD AUTO: 0.45 K/UL
MONOCYTES NFR BLD AUTO: 6.1 %
NEUTROPHILS # BLD AUTO: 4.59 K/UL
NEUTROPHILS NFR BLD AUTO: 62.7 %
NITRITE URINE: NEGATIVE
NONHDLC SERPL-MCNC: 153 MG/DL
PH URINE: 5.5
PLATELET # BLD AUTO: 322 K/UL
POTASSIUM SERPL-SCNC: 4.8 MMOL/L
PROT SERPL-MCNC: 7.5 G/DL
PROTEIN URINE: NORMAL MG/DL
RBC # BLD: 4.7 M/UL
RBC # FLD: 12.1 %
SODIUM SERPL-SCNC: 140 MMOL/L
SPECIFIC GRAVITY URINE: 1.02
TRIGL SERPL-MCNC: 181 MG/DL
TSH SERPL-ACNC: 1.76 UIU/ML
URATE SERPL-MCNC: 5.3 MG/DL
UROBILINOGEN URINE: 0.2 MG/DL
WBC # FLD AUTO: 7.32 K/UL

## 2023-12-05 LAB
SURGICAL PATHOLOGY STUDY: SIGNIFICANT CHANGE UP
SURGICAL PATHOLOGY STUDY: SIGNIFICANT CHANGE UP

## 2023-12-06 ENCOUNTER — APPOINTMENT (OUTPATIENT)
Dept: FAMILY MEDICINE | Facility: CLINIC | Age: 38
End: 2023-12-06
Payer: MEDICAID

## 2023-12-06 VITALS — TEMPERATURE: 97.6 F | DIASTOLIC BLOOD PRESSURE: 81 MMHG | SYSTOLIC BLOOD PRESSURE: 125 MMHG

## 2023-12-06 DIAGNOSIS — R82.90 UNSPECIFIED ABNORMAL FINDINGS IN URINE: ICD-10-CM

## 2023-12-06 DIAGNOSIS — E78.00 PURE HYPERCHOLESTEROLEMIA, UNSPECIFIED: ICD-10-CM

## 2023-12-06 DIAGNOSIS — E78.6 LIPOPROTEIN DEFICIENCY: ICD-10-CM

## 2023-12-06 PROCEDURE — 99214 OFFICE O/P EST MOD 30 MIN: CPT

## 2023-12-06 RX ORDER — ERGOCALCIFEROL 1.25 MG/1
1.25 MG CAPSULE, LIQUID FILLED ORAL
Qty: 5 | Refills: 3 | Status: ACTIVE | COMMUNITY
Start: 2023-12-06 | End: 1900-01-01

## 2023-12-08 ENCOUNTER — APPOINTMENT (OUTPATIENT)
Dept: OBGYN | Facility: CLINIC | Age: 38
End: 2023-12-08

## 2023-12-11 ENCOUNTER — APPOINTMENT (OUTPATIENT)
Dept: ULTRASOUND IMAGING | Facility: CLINIC | Age: 38
End: 2023-12-11
Payer: MEDICAID

## 2023-12-11 ENCOUNTER — APPOINTMENT (OUTPATIENT)
Dept: GYNECOLOGIC ONCOLOGY | Facility: CLINIC | Age: 38
End: 2023-12-11
Payer: MEDICAID

## 2023-12-11 VITALS
DIASTOLIC BLOOD PRESSURE: 79 MMHG | BODY MASS INDEX: 32.79 KG/M2 | HEART RATE: 86 BPM | SYSTOLIC BLOOD PRESSURE: 115 MMHG | WEIGHT: 167 LBS | HEIGHT: 60 IN

## 2023-12-11 DIAGNOSIS — Z48.89 ENCOUNTER FOR OTHER SPECIFIED SURGICAL AFTERCARE: ICD-10-CM

## 2023-12-11 DIAGNOSIS — G89.29 RIGHT LOWER QUADRANT PAIN: ICD-10-CM

## 2023-12-11 DIAGNOSIS — R39.9 UNSPECIFIED SYMPTOMS AND SIGNS INVOLVING THE GENITOURINARY SYSTEM: ICD-10-CM

## 2023-12-11 DIAGNOSIS — R10.31 RIGHT LOWER QUADRANT PAIN: ICD-10-CM

## 2023-12-11 PROCEDURE — 76856 US EXAM PELVIC COMPLETE: CPT

## 2023-12-11 PROCEDURE — 99024 POSTOP FOLLOW-UP VISIT: CPT

## 2023-12-11 PROCEDURE — 76830 TRANSVAGINAL US NON-OB: CPT

## 2023-12-12 ENCOUNTER — APPOINTMENT (OUTPATIENT)
Dept: GYNECOLOGIC ONCOLOGY | Facility: CLINIC | Age: 38
End: 2023-12-12
Payer: MEDICAID

## 2023-12-12 PROCEDURE — 99024 POSTOP FOLLOW-UP VISIT: CPT

## 2023-12-12 PROCEDURE — 99213 OFFICE O/P EST LOW 20 MIN: CPT | Mod: 24,95

## 2023-12-12 RX ORDER — DOXYCYCLINE HYCLATE 100 MG/1
100 TABLET ORAL
Qty: 28 | Refills: 0 | Status: ACTIVE | COMMUNITY
Start: 2023-12-12 | End: 1900-01-01

## 2023-12-12 RX ORDER — METRONIDAZOLE 500 MG/1
500 TABLET ORAL TWICE DAILY
Qty: 28 | Refills: 0 | Status: ACTIVE | COMMUNITY
Start: 2023-12-12 | End: 1900-01-01

## 2023-12-13 NOTE — ASSESSMENT
[FreeTextEntry1] : 37 yo  with recent PPROM at 21 weeks with loss of previable infant here for follow up of her recently diagnosed endocervical adenocarcinoma now s/p CKC, ECC and D&C revealing AIS with negative margins.   - Patients history and work up to date reviewed in detail. - She requested to meet today via vteb to review her final pathology results in detail. We also reviewed her TVUS which was overall within normal limits. We did discuss my concern of her having a PET avid lesion on the right adnexa after her pregnancy. We reviewed the limitation of ultrasound and my recommendation for interval repeat PET.  We reviewed her recent conization results in detail and it revealing AIS and Minute foci of stratified mucinous intraepithelial lesion (SMILE). We discussed her benign EMC and ECC. We also reviewed her initial colposcopy with cervical biopsy results revealing endocervical adenocarcinoma. I discussed given the discordance in pathology that I would have her pathology reviewed at our interdisciplinary TB meeting. The patient highly desires future fertility and would like to pursue observation if feasible.  - Adenocarcinoma in situ (AIS) of the cervix is a premalignant precursor to cervical adenocarcinoma. The usual interval between clinically detectable AIS and early invasion appears to be at least five years.  - We discussed that I recommend hysterectomy for patients with AIS who do not desire fertility preservation. Hysterectomy eliminates residual disease, which is likely to progress to invasive disease, although this may take five or more years. Hysterectomy also reduces the risks of developing recurrent AIS and missing a concomitant or subsequent adenocarcinoma.  -For patients with excisional margins and ECC negative for AIS, I still recommend hysterectomy because the alternative, surveillance, has not been proven effective in preventing progression to invasive disease. Because of the pattern of disease distribution of AIS (multifocal, high in the endocervical canal, inside endocervical clefts), negative margins on a cone biopsy specimen or a negative ECC does not necessarily ensure that the lesion has been completely excised. This also greatly limits the ability of surveillance with cervical cytology, colposcopy, biopsy, and endocervical sampling to detect residual or recurrent disease or adenocarcinoma, so adenocarcinoma may be diagnosed at a more advanced stage, reducing survival compared with hysterectomy soon after conization. - For patients who desire fertility preservation and who have excisional margins or ECC positive for AIS, I perform a repeat diagnostic excisional procedure approximately six weeks after the first excisional procedure to allow for sufficient healing and resolution of inflammation of the cervix. If the repeat excisional margin or ECC is also positive, we recommend hysterectomy to reduce the risks of residual or recurrent disease and concomitant or subsequent invasive adenocarcinoma, as described above. Although a third excisional procedure is sometimes technically feasible, the risk of operative complications and  delivery in subsequent pregnancy increases with repeat procedures. If hysterectomy becomes necessary, patients of reproductive age who have not undergone concomitant oophorectomy may be candidates for in vitro fertilization using a gestational carrier. If the initial or repeat excisional margin and the ECC are negative, surveillance is an acceptable option for patients who are willing to accept a higher risk of subsequent diagnosis of cervical adenocarcinoma since negative margins do not eliminate the risk of residual or recurrent AIS or concomitant or subsequent invasive adenocarcinoma. Surveillance involves follow up every 6 months for 3 years and then annually x2 years (pap, HPV, colposcopy and ECC at each visit). - Additionally, we discussed that I would like a repeat PET CT to re-evaluate for any potential disease (i.e. in the right adnexa) if observation is the ultimate plan but will present at our TB meeting.  - I spent the time noted on the day of this patient encounter preparing for, providing and documenting the above service. The time spent was about 30 minutes. I have counseled and educated the patient on the differential, workup, disease course, and treatment/management plan. Education was provided to the patient during this encounter. All questions and concerns were answered and addressed in detail.

## 2023-12-13 NOTE — REASON FOR VISIT
[de-identified] : 11/22/23 [de-identified] : cone biopsy [de-identified] : **Please note that this visit was converted to telemed due to technical difficulties** Patient gave permission for this visit today.  Denies f/c/n/v/d.  Overall feels well.  Meeting milestones of recovery.  Regular BM and voiding freely.  She does have tenderness to her RLQ and has had some postoperative spotting as well as her regular period.  She requested to meet today via vteb to review her final pathology results in detail. We also reviewed her TVUS which was overall within normal limits. We did discuss my concern of her having a PET avid lesion on the right adnexa after her pregnancy. We reviewed the limitation of ultrasound and my recommendation for interval repeat PET.  We reviewed her recent conization results in detail and it revealing AIS and Minute foci of stratified mucinous intraepithelial lesion (SMILE). We discussed her benign EMC and ECC. We also reviewed her initial colposcopy with cervical biopsy results revealing endocervical adenocarcinoma. I discussed given the discordance in pathology that I would have her pathology reviewed at our interdisciplinary TB meeting. The patient highly desires future fertility and would like to pursue observation if feasible.   Final path: 1.  Cervix, cone excision: -  Adenocarcinoma in situ -  Minute foci of stratified mucinous intraepithelial lesion (SMILE) -  Margins are negative  2.  Posterior aspect of cone excision: -   Benign endocervical mucosa  3.  Additional 9:00 inferior margin biopsy: -   Benign endocervical mucosa  4.  Endocervix, curettings: -   Secretory phase endometrium -    No endocervical tissue present  5.  Endometrium, curettings: -   Secretory phase endometrium  [Interpreters_FullName] : Elliott [Interpreters_IDNumber] : [id number] [[id number]] : [place]

## 2023-12-15 LAB
APPEARANCE: ABNORMAL
BACTERIA UR CULT: NORMAL
BACTERIA: ABNORMAL /HPF
BILIRUBIN URINE: NEGATIVE
BLOOD URINE: NEGATIVE
CAST: 0 /LPF
COLOR: YELLOW
EPITHELIAL CELLS: 5 /HPF
GLUCOSE QUALITATIVE U: NEGATIVE MG/DL
KETONES URINE: NEGATIVE MG/DL
LEUKOCYTE ESTERASE URINE: ABNORMAL
MICROSCOPIC-UA: NORMAL
NITRITE URINE: NEGATIVE
PH URINE: 5
PROTEIN URINE: NEGATIVE MG/DL
RED BLOOD CELLS URINE: 1 /HPF
SPECIFIC GRAVITY URINE: 1.02
UROBILINOGEN URINE: 0.2 MG/DL
WHITE BLOOD CELLS URINE: 9 /HPF

## 2023-12-19 PROBLEM — Z48.89 POSTOPERATIVE VISIT: Status: RESOLVED | Noted: 2023-12-11 | Resolved: 2023-12-19

## 2023-12-19 PROBLEM — R10.31 ABDOMINAL PAIN, RLQ (RIGHT LOWER QUADRANT): Status: ACTIVE | Noted: 2023-12-11

## 2023-12-19 NOTE — DISCUSSION/SUMMARY
[Firm] : soft [Tender] : nontender [Abnormal Bowel Sounds] : normal bowel sounds [Rebound] : no rebound tenderness [Vaginal Exam Abnormal] : normal vaginal exam [FreeTextEntry9] : RLQ guarding on vaginal palpation.   [de-identified] : Well-healing cervical cone biopsy site, no signs and symptoms of infection, no bleeding

## 2023-12-19 NOTE — REASON FOR VISIT
[de-identified] : 11/22/23 [de-identified] : cone biopsy [de-identified] : Denies f/c/n/v/d.  Overall feels well.  Meeting milestones of recovery.  Regular BM and voiding freely.  She does have tenderness to her RLQ, and has had some postoperative spotting as well as her regular period.    Denies any other symptoms.  Final path: 1.  Cervix, cone excision: -  Adenocarcinoma in situ -  Minute foci of stratified mucinous intraepithelial lesion (SMILE) -  Margins are negative  2.  Posterior aspect of cone excision: -   Benign endocervical mucosa  3.  Additional 9:00 inferior margin biopsy: -   Benign endocervical mucosa  4.  Endocervix, curettings: -   Secretory phase endometrium -    No endocervical tissue present  5.  Endometrium, curettings: -   Secretory phase endometrium  [Interpreters_IDNumber] : [id number] [Interpreters_FullName] : Elliott [[id number]] : Citizen of the [place]

## 2023-12-19 NOTE — ASSESSMENT
[FreeTextEntry1] :  -  Cone bed healing well, no signs of infection -  Advised no tub baths or anything in the vagina for an additional 2 weeks. -  Copy of pathology provided to patient -  Final path:  AIC; minute focus of SMILE - likely 4-6 mos follow up with Dr. Andujar.   -Will order UA, urine culture as well as TVUS for her symptoms of right lower quadrant pain. -Patient will follow-up with Dr. Andujar this week for a TEB appointment. -  Patient verbalized understanding of plan and agrees with same. -  All questions answered.

## 2023-12-20 ENCOUNTER — NON-APPOINTMENT (OUTPATIENT)
Age: 38
End: 2023-12-20

## 2023-12-28 PROBLEM — Z48.89 POSTOPERATIVE VISIT: Status: ACTIVE | Noted: 2023-12-19

## 2023-12-29 ENCOUNTER — APPOINTMENT (OUTPATIENT)
Dept: GYNECOLOGIC ONCOLOGY | Facility: CLINIC | Age: 38
End: 2023-12-29
Payer: MEDICAID

## 2023-12-29 VITALS
DIASTOLIC BLOOD PRESSURE: 84 MMHG | BODY MASS INDEX: 32.79 KG/M2 | SYSTOLIC BLOOD PRESSURE: 127 MMHG | WEIGHT: 167 LBS | HEART RATE: 93 BPM | OXYGEN SATURATION: 97 % | HEIGHT: 60 IN

## 2023-12-29 DIAGNOSIS — Z48.89 ENCOUNTER FOR OTHER SPECIFIED SURGICAL AFTERCARE: ICD-10-CM

## 2023-12-29 PROCEDURE — 99214 OFFICE O/P EST MOD 30 MIN: CPT | Mod: 24

## 2023-12-29 NOTE — DISCUSSION/SUMMARY
[Doing Well] : is doing well [Excellent Pain Control] : has excellent pain control [No Sign of Infection] : is showing no signs of infection [Firm] : soft [Tender] : nontender [Abnormal Bowel Sounds] : normal bowel sounds [Guarding] : no guarding [Rebound] : no rebound tenderness [Vaginal Exam Abnormal] : normal vaginal exam

## 2023-12-29 NOTE — REASON FOR VISIT
[Post Op] : post op visit [[employer] ] : provided by [employer]   [de-identified] : 11/22/23 [de-identified] : cone biopsy [de-identified] : Denies f/c/n/v/d.  Overall feels well.  Meeting milestones of recovery.  Regular BM and voiding freely.  She previously reported to have tenderness to her RLQ and has had some postoperative spotting as well as her regular period.  Giiven the prior right adnexal lesion and her discomfort she wsa treated with antibiotics and now reports resolution of her discomfort. We reviewed the interdicsiplinary team TB meeting recommendation to continue with surveillance and once she has completed child bearing to have definitive surgery done then. We also reviewed her TVUS which was overall within normal limits. We did discuss my concern of her having a PET avid lesion on the right adnexa after her pregnancy. Plan to repeat this, order placed.  We reviewed the limitation of ultrasound and my recommendation for interval repeat PET.  We reviewed her recent conization results in detail and it revealing AIS and Minute foci of stratified mucinous intraepithelial lesion (SMILE). We discussed her benign EMC and ECC. We also reviewed her initial colposcopy with cervical biopsy results revealing endocervical adenocarcinoma but final review at  will addend this to state that its preinvasive disease only.    Final path: 1.  Cervix, cone excision: -  Adenocarcinoma in situ -  Minute foci of stratified mucinous intraepithelial lesion (SMILE) -  Margins are negative  2.  Posterior aspect of cone excision: -   Benign endocervical mucosa  3.  Additional 9:00 inferior margin biopsy: -   Benign endocervical mucosa  4.  Endocervix, curettings: -   Secretory phase endometrium -    No endocervical tissue present  5.  Endometrium, curettings: -   Secretory phase endometrium  GYN ONC Tumor Board on 12/20/23 Diagnosis: Adenocarcinoma in situ, minute foci of stratified mucinous intraepithelial lesion (SMILE) Recommendation: observation  [Interpreters_IDNumber] : [id number] [Interpreters_FullName] : Elliott [TWNoteComboBox1] : [place]

## 2023-12-29 NOTE — ASSESSMENT
[FreeTextEntry1] : 37 yo  with recent PPROM at 21 weeks with loss of previable infant here for follow up of her recently diagnosed endocervical adenocarcinoma now s/p CKC, ECC and D&C revealing AIS with negative margins.   - Patients history and work up to date reviewed in detail. - Meeting milestones of recovery.  She previously reported to have tenderness to her RLQ and has had some postoperative spotting as well as her regular period.  Giiven the prior right adnexal lesion and her discomfort she wsa treated with antibiotics and now reports resolution of her discomfort. We reviewed the interdicsiplinary team TB meeting recommendation to continue with surveillance and once she has completed child bearing to have definitive surgery done then. We also reviewed her TVUS which was overall within normal limits. We did discuss my concern of her having a PET avid lesion on the right adnexa after her pregnancy. Plan to repeat this, order placed.  We reviewed the limitation of ultrasound and my recommendation for interval repeat PET.  We reviewed her recent conization results in detail and it revealing AIS and Minute foci of stratified mucinous intraepithelial lesion (SMILE). We discussed her benign EMC and ECC. We also reviewed her initial colposcopy with cervical biopsy results revealing endocervical adenocarcinoma but final review at TB will addend this to state that its preinvasive disease only.   - Adenocarcinoma in situ (AIS) of the cervix is a premalignant precursor to cervical adenocarcinoma. The usual interval between clinically detectable AIS and early invasion appears to be at least five years.  - We discussed that I recommend hysterectomy for patients with AIS who do not desire fertility preservation. Hysterectomy eliminates residual disease, which is likely to progress to invasive disease, although this may take five or more years. Hysterectomy also reduces the risks of developing recurrent AIS and missing a concomitant or subsequent adenocarcinoma.  -For patients with excisional margins and ECC negative for AIS, I still recommend hysterectomy because the alternative, surveillance, has not been proven effective in preventing progression to invasive disease. Because of the pattern of disease distribution of AIS (multifocal, high in the endocervical canal, inside endocervical clefts), negative margins on a cone biopsy specimen or a negative ECC does not necessarily ensure that the lesion has been completely excised. This also greatly limits the ability of surveillance with cervical cytology, colposcopy, biopsy, and endocervical sampling to detect residual or recurrent disease or adenocarcinoma, so adenocarcinoma may be diagnosed at a more advanced stage, reducing survival compared with hysterectomy soon after conization. - For patients who desire fertility preservation and who have excisional margins or ECC positive for AIS, I perform a repeat diagnostic excisional procedure approximately six weeks after the first excisional procedure to allow for sufficient healing and resolution of inflammation of the cervix. If the repeat excisional margin or ECC is also positive, we recommend hysterectomy to reduce the risks of residual or recurrent disease and concomitant or subsequent invasive adenocarcinoma, as described above. Although a third excisional procedure is sometimes technically feasible, the risk of operative complications and  delivery in subsequent pregnancy increases with repeat procedures. If hysterectomy becomes necessary, patients of reproductive age who have not undergone concomitant oophorectomy may be candidates for in vitro fertilization using a gestational carrier. If the initial or repeat excisional margin and the ECC are negative, surveillance is an acceptable option for patients who are willing to accept a higher risk of subsequent diagnosis of cervical adenocarcinoma since negative margins do not eliminate the risk of residual or recurrent AIS or concomitant or subsequent invasive adenocarcinoma. Surveillance involves follow up every 6 months for 3 years and then annually x2 years (pap, HPV, colposcopy and ECC at each visit). - Additionally, we discussed that I would like a repeat PET CT to re-evaluate for any potential disease (i.e. in the right adnexa). Order was placed.  -For follow up thereafter to review her results, may be via vteb.  - I spent the time noted on the day of this patient encounter preparing for, providing and documenting the above service. The time spent was about 30 minutes. I have counseled and educated the patient on the differential, workup, disease course, and treatment/management plan. Education was provided to the patient during this encounter. All questions and concerns were answered and addressed in detail.

## 2024-01-16 ENCOUNTER — APPOINTMENT (OUTPATIENT)
Dept: GYNECOLOGIC ONCOLOGY | Facility: CLINIC | Age: 39
End: 2024-01-16
Payer: MEDICAID

## 2024-01-16 PROCEDURE — 99212 OFFICE O/P EST SF 10 MIN: CPT | Mod: 24,95

## 2024-01-16 NOTE — DISCUSSION/SUMMARY
[Reviewed Clinical Lab Test(s)] : Results of clinical tests were reviewed. [Reviewed Radiology Report(s)] : Radiology reports were reviewed. [Discuss Tests w/Referring Providers] : Results of labs/radiology studies and the treatment recommendations were discussed with performing/referring physician. [Discuss Alternatives/Risks/Benefits w/Patient] : All alternatives, risks, and benefits were discussed with the patient/family and all questions were answered.  Patient expressed good understanding and appreciates the importance of follow up as recommended. [Visit Time ___ Minutes] : [unfilled] minutes [FreeTextEntry1] : Today we reviewed in detail again that she previously reported to have tenderness to her RLQ and PET avid area of her right adnexa on her PET/CT. Given the prior right adnexal lesion and her discomfort she wsa treated with antibiotics and now reports resolution of her discomfort. We reviewed the interdicsiplinary team TB meeting recommendation to continue with surveillance and once she has completed child bearing to have definitive surgery done then. We also reviewed her TVUS which was overall within normal limits. We did discuss my concern of her having a PET avid lesion on the right adnexa after her pregnancy. Plan to repeat this, order placed but she noted  that this was not approved by her insurance until 90 days after the last. Therefore, we made the plan to have her PET/CT done on 2/16 to allow approval given the insurance constraints. We reviewed the limitation of ultrasound and my recommendation for interval repeat PET. We reviewed her recent conization results in detail and it revealing AIS and Minute foci of stratified mucinous intraepithelial lesion (SMILE). We discussed her benign EMC and ECC. We also reviewed her initial colposcopy with cervical biopsy results revealing endocervical adenocarcinoma but final review at  will addend this to state that its preinvasive disease only.

## 2024-01-16 NOTE — HISTORY OF PRESENT ILLNESS
[FreeTextEntry1] : GYN/Ref:  Laurie Machado MD PCP:    Ms. Reyes, 38 years old, s/p cone biopsy for ASC-H +HPV18 on 11/22/23.  Final path: AIS and a minute focus of stratified squamous mucinous intraepithelial lesion (SMILE) with negative margins.  ECC revealed no endocervical tissue and EMC benign.    GYN ONC Tumor Board on 12/20/23 Diagnosis: Adenocarcinoma in situ, minute foci of stratified mucinous intraepithelial lesion (SMILE) Recommendation: observation  Today we reviewed in detail again that she previously reported to have tenderness to her RLQ and PET avid area of her right adnexa on her PET/CT. Given the prior right adnexal lesion and her discomfort she wsa treated with antibiotics and now reports resolution of her discomfort. We reviewed the interdicsiplinary team TB meeting recommendation to continue with surveillance and once she has completed child bearing to have definitive surgery done then. We also reviewed her TVUS which was overall within normal limits. We did discuss my concern of her having a PET avid lesion on the right adnexa after her pregnancy. Plan to repeat this, order placed but she noted  that this was not approved by her insurance until 90 days after the last. Therefore, we made the plan to have her PET/CT done on 2/16 to allow approval given the insurance constraints. We reviewed the limitation of ultrasound and my recommendation for interval repeat PET. We reviewed her recent conization results in detail and it revealing AIS and Minute foci of stratified mucinous intraepithelial lesion (SMILE). We discussed her benign EMC and ECC. We also reviewed her initial colposcopy with cervical biopsy results revealing endocervical adenocarcinoma but final review at TB will addend this to state that its preinvasive disease only.

## 2024-01-16 NOTE — ASSESSMENT
[FreeTextEntry1] : 39 yo  with recent PPROM at 21 weeks with loss of previable infant here for follow up of her recently diagnosed endocervical adenocarcinoma now s/p CKC, ECC and D&C revealing AIS with negative margins

## 2024-02-27 ENCOUNTER — RESULT REVIEW (OUTPATIENT)
Age: 39
End: 2024-02-27

## 2024-02-27 ENCOUNTER — APPOINTMENT (OUTPATIENT)
Dept: GYNECOLOGIC ONCOLOGY | Facility: CLINIC | Age: 39
End: 2024-02-27
Payer: MEDICAID

## 2024-02-27 DIAGNOSIS — N83.209 UNSPECIFIED OVARIAN CYST, UNSPECIFIED SIDE: ICD-10-CM

## 2024-02-27 DIAGNOSIS — Z32.01 ENCOUNTER FOR PREGNANCY TEST, RESULT POSITIVE: ICD-10-CM

## 2024-02-27 DIAGNOSIS — D06.9 CARCINOMA IN SITU OF CERVIX, UNSPECIFIED: ICD-10-CM

## 2024-02-27 PROCEDURE — 99212 OFFICE O/P EST SF 10 MIN: CPT | Mod: TH

## 2024-02-28 NOTE — HISTORY OF PRESENT ILLNESS
[Home] : at home, [unfilled] , at the time of the visit. [Medical Office: ([facility])___] : at the medical office located in  [FreeTextEntry1] : **Please note that this visit was converted to telemed due to technical difficulties** Permission was granted for this visit.  GYN/Ref:  Laurie Machado MD  Ms. Reyes, 38 years old, s/p cone biopsy for ASC-H +HPV18 on 11/22/23.  Final path: AIS and a minute focus of stratified squamous mucinous intraepithelial lesion (SMILE) with negative margins.  ECC revealed no endocervical tissue and EMC benign.    GYN ONC Tumor Board on 12/20/23 Diagnosis: Adenocarcinoma in situ, minute foci of stratified mucinous intraepithelial lesion (SMILE) Recommendation: observation  At her prior visits, we reviewed in detail again that she previously reported to have tenderness to her RLQ and PET avid area of her right adnexa on her PET/CT. Given the prior right adnexal lesion and her discomfort she wsa treated with antibiotics and now reports resolution of her discomfort. We reviewed the interdicsiplinary team TB meeting recommendation to continue with surveillance and once she has completed child bearing to have definitive surgery done then. We also reviewed her TVUS which was overall within normal limits. We did discuss my concern of her having a PET avid lesion on the right adnexa after her pregnancy. Plan to repeat this, order placed but she noted  that this was not approved by her insurance until 90 days after the last. Therefore, we made the plan to have her PET/CT done on 2/16 to allow approval given the insurance constraints. We reviewed the limitation of ultrasound and my recommendation for interval repeat PET. We reviewed her recent conization results in detail and it revealing AIS and Minute foci of stratified mucinous intraepithelial lesion (SMILE). We discussed at her last visit her benign EMC and ECC. We also reviewed her initial colposcopy with cervical biopsy results revealing endocervical adenocarcinoma but final review at TB will addend this to state that its preinvasive disease only.  Today, she requested a vteb because she has a +home pregnancy test. Her LMP was 1/22/24 which is this is a viable IUP she is at 5w1d approximately today. She is feeling well and overall without complaint except some intermittent RLQ pain. Advised her to have a sonogram done which was ordered (to reassess her right adnexa as well as to see if possible IUP). I will also refer her to an OB/GYN who can help her with her obstetric care, she prefers a provider in [place]. I will have my navigation team reach out to her. Precuations provided on when to seek evaluation sooner. Plan to review her TVUS via vteb once obtained. Chete sent to Dr. Hernandez from [facility] in anticipation this is a viable IUP as she will need a high risk OB provider to help manage her care.

## 2024-02-28 NOTE — DISCUSSION/SUMMARY
[Reviewed Radiology Report(s)] : Radiology reports were reviewed. [Reviewed Clinical Lab Test(s)] : Results of clinical tests were reviewed. [Discuss Alternatives/Risks/Benefits w/Patient] : All alternatives, risks, and benefits were discussed with the patient/family and all questions were answered.  Patient expressed good understanding and appreciates the importance of follow up as recommended. [Discuss Tests w/Referring Providers] : Results of labs/radiology studies and the treatment recommendations were discussed with performing/referring physician. [Visit Time ___ Minutes] : [unfilled] minutes [FreeTextEntry1] : - Today, she requested a vteb because she has a +home pregnancy test. Her LMP was 1/22/24 which is this is a viable IUP she is at 5w1d approximately today. She is feeling well and overall without complaint except some intermittent RLQ pain. Advised her to have a sonogram done which was ordered (to reassess her right adnexa as well as to see if possible IUP). I will also refer her to an OB/GYN who can help her with her obstetric care, she prefers a provider in [place]. I will have my navigation team reach out to her. Precuations provided on when to seek evaluation sooner. Plan to review her TVUS via vteb once obtained. Chete sent to Dr. Hernandez from [facility] in anticipation this is a viable IUP as she will need a high risk OB provider to help manage her care.  - I spent the time noted on the day of this patient encounter preparing for, providing and documenting the above service. I have counseled and educated the patient on the differential, workup, disease course, and treatment/management plan. Education was provided to the patient during this encounter. All questions and concerns were answered and addressed in detail.

## 2024-02-28 NOTE — REASON FOR VISIT
[FreeTextEntry1] :  Follow up AIS Pt called the office with a + pregnancy test - TEB to discuss next steps

## 2024-02-29 ENCOUNTER — APPOINTMENT (OUTPATIENT)
Dept: NUCLEAR MEDICINE | Facility: CLINIC | Age: 39
End: 2024-02-29

## 2024-03-09 PROBLEM — C53.9 PRIMARY CERVICAL CANCER: Status: ACTIVE | Noted: 2023-10-03

## 2024-03-09 PROBLEM — R73.03 PREDIABETES: Status: ACTIVE | Noted: 2024-03-09

## 2024-03-09 PROBLEM — Z86.39 HISTORY OF VITAMIN D DEFICIENCY: Status: RESOLVED | Noted: 2023-09-21 | Resolved: 2024-03-09

## 2024-03-09 RX ORDER — AMOXICILLIN 875 MG/1
875 TABLET, FILM COATED ORAL
Qty: 10 | Refills: 0 | Status: DISCONTINUED | COMMUNITY
Start: 2023-11-07 | End: 2024-03-09

## 2024-03-12 ENCOUNTER — APPOINTMENT (OUTPATIENT)
Dept: ULTRASOUND IMAGING | Facility: CLINIC | Age: 39
End: 2024-03-12
Payer: MEDICAID

## 2024-03-12 PROCEDURE — 76817 TRANSVAGINAL US OBSTETRIC: CPT

## 2024-03-12 PROCEDURE — 76801 OB US < 14 WKS SINGLE FETUS: CPT

## 2024-03-15 ENCOUNTER — NON-APPOINTMENT (OUTPATIENT)
Age: 39
End: 2024-03-15

## 2024-03-16 ENCOUNTER — APPOINTMENT (OUTPATIENT)
Dept: FAMILY MEDICINE | Facility: CLINIC | Age: 39
End: 2024-03-16

## 2024-03-16 DIAGNOSIS — R73.03 PREDIABETES.: ICD-10-CM

## 2024-03-16 DIAGNOSIS — C53.9 MALIGNANT NEOPLASM OF CERVIX UTERI, UNSPECIFIED: ICD-10-CM

## 2024-03-16 DIAGNOSIS — Z86.39 PERSONAL HISTORY OF OTHER ENDOCRINE, NUTRITIONAL AND METABOLIC DISEASE: ICD-10-CM

## 2024-03-19 ENCOUNTER — ASOB RESULT (OUTPATIENT)
Age: 39
End: 2024-03-19

## 2024-03-19 ENCOUNTER — APPOINTMENT (OUTPATIENT)
Dept: OBGYN | Facility: CLINIC | Age: 39
End: 2024-03-19
Payer: MEDICAID

## 2024-03-19 PROCEDURE — 76817 TRANSVAGINAL US OBSTETRIC: CPT

## 2024-03-21 ENCOUNTER — APPOINTMENT (OUTPATIENT)
Dept: OBGYN | Facility: CLINIC | Age: 39
End: 2024-03-21

## 2024-03-27 PROBLEM — R87.810 HUMAN PAPILLOMAVIRUS (HPV) TYPE 18 DNA DETECTED IN CERVICAL SPECIMEN: Status: ACTIVE | Noted: 2024-01-16

## 2024-03-29 ENCOUNTER — APPOINTMENT (OUTPATIENT)
Dept: GYNECOLOGIC ONCOLOGY | Facility: CLINIC | Age: 39
End: 2024-03-29
Payer: MEDICAID

## 2024-03-29 VITALS
HEART RATE: 82 BPM | SYSTOLIC BLOOD PRESSURE: 130 MMHG | WEIGHT: 172 LBS | DIASTOLIC BLOOD PRESSURE: 82 MMHG | BODY MASS INDEX: 33.77 KG/M2 | HEIGHT: 60 IN | OXYGEN SATURATION: 97 %

## 2024-03-29 DIAGNOSIS — R87.810 CERVICAL HIGH RISK HUMAN PAPILLOMAVIRUS (HPV) DNA TEST POSITIVE: ICD-10-CM

## 2024-03-29 PROCEDURE — 99213 OFFICE O/P EST LOW 20 MIN: CPT

## 2024-03-30 NOTE — ASSESSMENT
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

## 2024-03-30 NOTE — REASON FOR VISIT
[FreeTextEntry1] :  Follow up AIS Pt called the office with a + pregnancy test  Following with Dr. Padilla

## 2024-03-30 NOTE — HISTORY OF PRESENT ILLNESS
[FreeTextEntry1] : GYN/Ref:  Laurie Machado MD  Ms. Reyes, 38 years old, s/p cone biopsy for ASC-H +HPV18 on 11/22/23.  Final path: AIS and a minute focus of stratified squamous mucinous intraepithelial lesion (SMILE) with negative margins.  ECC revealed no endocervical tissue and EMC benign.    GYN ONC Tumor Board on 12/20/23 Diagnosis: Adenocarcinoma in situ, minute foci of stratified mucinous intraepithelial lesion (SMILE) Recommendation: observation  At her prior visits, we reviewed in detail again that she previously reported to have tenderness to her RLQ and PET avid area of her right adnexa on her PET/CT. Given the prior right adnexal lesion and her discomfort she wsa treated with antibiotics and now reports resolution of her discomfort. We reviewed the interdicsiplinary team TB meeting recommendation to continue with surveillance and once she has completed child bearing to have definitive surgery done then. We also reviewed her TVUS which was overall within normal limits. We did discuss my concern of her having a PET avid lesion on the right adnexa after her pregnancy. Plan to repeat this, order placed but she noted  that this was not approved by her insurance until 90 days after the last. Therefore, we made the plan to have her PET/CT done on 2/16 to allow approval given the insurance constraints. We reviewed the limitation of ultrasound and my recommendation for interval repeat PET. We reviewed her recent conization results in detail and it revealing AIS and Minute foci of stratified mucinous intraepithelial lesion (SMILE). We discussed at her last visit her benign EMC and ECC. We also reviewed her initial colposcopy with cervical biopsy results revealing endocervical adenocarcinoma but final review at TB will addend this to state that its preinvasive disease only.  Today, she requested a vteb because she has a +home pregnancy test. Her LMP was 1/22/24 which is this is a viable IUP she is at 5w1d approximately today. She is feeling well and overall without complaint except some intermittent RLQ pain. Advised her to have a sonogram done which was ordered (to reassess her right adnexa as well as to see if possible IUP). I will also refer her to an OB/GYN who can help her with her obstetric care, she prefers a provider in [place]. I will have my navigation team reach out to her. Precuations provided on when to seek evaluation sooner. Plan to review her TVUS via vteb once obtained. Taske sent to Dr. Hernandez from [facility] in anticipation this is a viable IUP as she will need a high risk OB provider to help manage her care.   We reviewed her intraoperative findings her postoperative course as well as her final pathology results.  We discussed that given her final pathology demonstrated low risk disease and the plan will be for surveillance.  The patient presents for close interval follow-up.  She recently has seen Dr. Padilla from OB/GYN who has kindly updated me on the patient's case.  The patient was seen for initial obstetric care and unfortunately was found to have a blighted ovum.  Her hCG levels have been downtrending and her provider has given her misoprostol which she is starting tomorrow.  Patient does notice some bleeding and had some contraction-like pain for which she thinks she passed a sac and this that was brought into the OB/GYN office for review.  Today she is overall doing well but is understandably upset about this early pregnancy loss.  We discussed the potential for referral to [facility] to help and she is considering this.  We discussed that we will follow-up in approximately 3 months for repeat examination with colposcopy under biopsies and indicated.  Supportive counseling provided throughout this visit.  Imaging: 3/12/24 OB US ([facility]) Uterus: Gestational Sac Size (mean): 1.1 cm Gestational Sac Shape : Normal. Crown Rump Length: Not seen Estimated Gestational Age: 5 weeks, 6 days by mean gestational sac size. Yolk Sac: Not seen Fetal Heart Rate: Not seen Right ovary: 3.3 cm x 2.7 cm x 2.8 cm including a 2.4 cm corpus luteum. 5 mm echogenic focus, likely a corpus albicans. Left ovary: 2.3 cm x 1.1 cm x 1.9 cm. Within normal limits. Fluid: None. IMPRESSION: Fluid collection in the uterus with MSD of <16 mm, which may represent an early normal IUP, pregnancy failure or a pseudosac with ectopic pregnancy. Serial hCG and ultrasound are recommended to determine the significance of these findings.

## 2024-03-30 NOTE — DISCUSSION/SUMMARY
[Face to Face Time___ Minutes] : with [unfilled] minutes in face to face consultation. [FreeTextEntry1] : - Today, We reviewed her intraoperative findings her postoperative course as well as her final pathology results.  We discussed that given her final pathology demonstrated low risk disease and the plan will be for surveillance.  The patient presents for close interval follow-up.  She recently has seen Dr. Padilla from OB/GYN who has kindly updated me on the patient's case.  The patient was seen for initial obstetric care and unfortunately was found to have a blighted ovum.  Her hCG levels have been downtrending and her provider has given her misoprostol which she is starting tomorrow.  Patient does notice some bleeding and had some contraction-like pain for which she thinks she passed a sac and this that was brought into the OB/GYN office for review.  Today she is overall doing well but is understandably upset about this early pregnancy loss.  We discussed the potential for referral to [facility] to help and she is considering this.  We discussed that we will follow-up in approximately 3 months for repeat examination with colposcopy under biopsies and indicated.  Supportive counseling provided throughout this visit. - I spent the time noted on the day of this patient encounter preparing for, providing and documenting the above service. I have counseled and educated the patient on the differential, workup, disease course, and treatment/management plan. Education was provided to the patient during this encounter. All questions and concerns were answered and addressed in detail.

## 2024-03-31 LAB — SURGICAL PATHOLOGY STUDY: SIGNIFICANT CHANGE UP

## 2024-04-01 ENCOUNTER — APPOINTMENT (OUTPATIENT)
Dept: OBGYN | Facility: CLINIC | Age: 39
End: 2024-04-01
Payer: MEDICAID

## 2024-04-01 ENCOUNTER — ASOB RESULT (OUTPATIENT)
Age: 39
End: 2024-04-01

## 2024-04-01 PROCEDURE — 76817 TRANSVAGINAL US OBSTETRIC: CPT

## 2024-06-09 PROBLEM — E78.5 HYPERLIPIDEMIA: Status: ACTIVE | Noted: 2024-03-09

## 2024-06-09 PROBLEM — E55.9 VITAMIN D DEFICIENCY: Status: ACTIVE | Noted: 2024-03-09

## 2024-06-09 PROBLEM — O24.419 GESTATIONAL DIABETES: Status: ACTIVE | Noted: 2023-09-25

## 2024-06-09 PROBLEM — C53.0: Status: ACTIVE | Noted: 2023-10-16

## 2024-06-09 NOTE — HISTORY OF PRESENT ILLNESS
[de-identified] : 38 years old female has past medical history of endocervical adenocarcinoma, at 30 weeks pregnancy, s/p CKC, ECC and D&C with negative margins. has gestational diabetes, vitamin D deficiency, and hyperlipidemia, came back for follow up.

## 2024-06-15 ENCOUNTER — APPOINTMENT (OUTPATIENT)
Dept: FAMILY MEDICINE | Facility: CLINIC | Age: 39
End: 2024-06-15

## 2024-06-15 DIAGNOSIS — E78.5 HYPERLIPIDEMIA, UNSPECIFIED: ICD-10-CM

## 2024-06-15 DIAGNOSIS — C53.0 MALIGNANT NEOPLASM OF ENDOCERVIX: ICD-10-CM

## 2024-06-15 DIAGNOSIS — O24.419 GESTATIONAL DIABETES MELLITUS IN PREGNANCY, UNSPECIFIED CONTROL: ICD-10-CM

## 2024-06-15 DIAGNOSIS — E55.9 VITAMIN D DEFICIENCY, UNSPECIFIED: ICD-10-CM

## 2024-07-19 ENCOUNTER — APPOINTMENT (OUTPATIENT)
Dept: GYNECOLOGIC ONCOLOGY | Facility: CLINIC | Age: 39
End: 2024-07-19
Payer: MEDICAID

## 2024-07-19 VITALS
HEART RATE: 82 BPM | BODY MASS INDEX: 33.77 KG/M2 | DIASTOLIC BLOOD PRESSURE: 86 MMHG | WEIGHT: 172 LBS | RESPIRATION RATE: 16 BRPM | HEIGHT: 60 IN | OXYGEN SATURATION: 97 % | SYSTOLIC BLOOD PRESSURE: 143 MMHG

## 2024-07-19 DIAGNOSIS — Z87.42 PERSONAL HISTORY OF OTHER DISEASES OF THE FEMALE GENITAL TRACT: ICD-10-CM

## 2024-07-19 PROCEDURE — 57454 BX/CURETT OF CERVIX W/SCOPE: CPT

## 2024-07-19 PROCEDURE — 99459 PELVIC EXAMINATION: CPT

## 2024-07-19 PROCEDURE — 99215 OFFICE O/P EST HI 40 MIN: CPT | Mod: 25

## 2024-07-20 ENCOUNTER — APPOINTMENT (OUTPATIENT)
Dept: FAMILY MEDICINE | Facility: CLINIC | Age: 39
End: 2024-07-20
Payer: MEDICAID

## 2024-07-20 ENCOUNTER — LABORATORY RESULT (OUTPATIENT)
Age: 39
End: 2024-07-20

## 2024-07-20 VITALS
HEIGHT: 60 IN | DIASTOLIC BLOOD PRESSURE: 85 MMHG | HEART RATE: 70 BPM | SYSTOLIC BLOOD PRESSURE: 138 MMHG | BODY MASS INDEX: 34.75 KG/M2 | WEIGHT: 177 LBS | OXYGEN SATURATION: 98 % | TEMPERATURE: 98 F

## 2024-07-20 DIAGNOSIS — E66.01 MORBID (SEVERE) OBESITY DUE TO EXCESS CALORIES: ICD-10-CM

## 2024-07-20 DIAGNOSIS — O24.419 GESTATIONAL DIABETES MELLITUS IN PREGNANCY, UNSPECIFIED CONTROL: ICD-10-CM

## 2024-07-20 DIAGNOSIS — B35.3 TINEA PEDIS: ICD-10-CM

## 2024-07-20 DIAGNOSIS — E55.9 VITAMIN D DEFICIENCY, UNSPECIFIED: ICD-10-CM

## 2024-07-20 DIAGNOSIS — F32.A DEPRESSION, UNSPECIFIED: ICD-10-CM

## 2024-07-20 DIAGNOSIS — C53.0 MALIGNANT NEOPLASM OF ENDOCERVIX: ICD-10-CM

## 2024-07-20 DIAGNOSIS — L60.9 NAIL DISORDER, UNSPECIFIED: ICD-10-CM

## 2024-07-20 DIAGNOSIS — E78.5 HYPERLIPIDEMIA, UNSPECIFIED: ICD-10-CM

## 2024-07-20 PROCEDURE — G0444 DEPRESSION SCREEN ANNUAL: CPT | Mod: 59

## 2024-07-20 PROCEDURE — G2211 COMPLEX E/M VISIT ADD ON: CPT | Mod: NC

## 2024-07-20 PROCEDURE — 99214 OFFICE O/P EST MOD 30 MIN: CPT | Mod: 25

## 2024-07-20 RX ORDER — PNV 119/IRON FUM/FOLIC ACID 29 MG-1 MG
TABLET ORAL DAILY
Qty: 30 | Refills: 11 | Status: ACTIVE | COMMUNITY
Start: 2024-07-20 | End: 1900-01-01

## 2024-07-20 RX ORDER — CLOTRIMAZOLE 10 MG/G
1 CREAM TOPICAL TWICE DAILY
Qty: 1 | Refills: 1 | Status: ACTIVE | COMMUNITY
Start: 2024-07-20 | End: 1900-01-01

## 2024-07-22 ENCOUNTER — APPOINTMENT (OUTPATIENT)
Dept: ULTRASOUND IMAGING | Facility: CLINIC | Age: 39
End: 2024-07-22

## 2024-07-22 PROCEDURE — 76830 TRANSVAGINAL US NON-OB: CPT

## 2024-07-22 PROCEDURE — 76856 US EXAM PELVIC COMPLETE: CPT

## 2024-07-23 LAB — HPV HIGH+LOW RISK DNA PNL CVX: NOT DETECTED

## 2024-07-26 LAB
CORE LAB BIOPSY: NORMAL
CYTOLOGY CVX/VAG DOC THIN PREP: ABNORMAL

## 2024-08-09 ENCOUNTER — APPOINTMENT (OUTPATIENT)
Dept: GYNECOLOGIC ONCOLOGY | Facility: CLINIC | Age: 39
End: 2024-08-09

## 2024-08-09 PROCEDURE — 99212 OFFICE O/P EST SF 10 MIN: CPT | Mod: 95

## 2024-08-09 NOTE — HISTORY OF PRESENT ILLNESS
[FreeTextEntry1] : **Please note that this visit was converted to telemed due to technical difficulties** Permission was granted for this visit.  GYN/Ref:  Laurie Machado MD  Ms. Reyes, 38 years old, s/p cone biopsy for ASC-H +HPV18 on 23.  Final path: AIS and a minute focus of stratified squamous mucinous intraepithelial lesion (SMILE) with negative margins.  ECC revealed no endocervical tissue and EMC benign.    GYN ONC Tumor Board on 23 Diagnosis: Adenocarcinoma in situ, minute foci of stratified mucinous intraepithelial lesion (SMILE) Recommendation: observation  At her prior visits, we reviewed in detail again that she previously reported to have tenderness to her RLQ and PET avid area of her right adnexa on her PET/CT. Given the prior right adnexal lesion and her discomfort she wsa treated with antibiotics and now reports resolution of her discomfort. We reviewed the interdicsiplinary team TB meeting recommendation to continue with surveillance and once she has completed child bearing to have definitive surgery done then. We also reviewed her TVUS which was overall within normal limits. We did discuss my concern of her having a PET avid lesion on the right adnexa after her pregnancy. Plan to repeat this, order placed but she noted  that this was not approved by her insurance until 90 days after the last. Therefore, we made the plan to have her PET/CT done on  to allow approval given the insurance constraints. We reviewed the limitation of ultrasound and my recommendation for interval repeat PET. We reviewed her recent conization results in detail and it revealing AIS and Minute foci of stratified mucinous intraepithelial lesion (SMILE). We discussed at her last visit her benign EMC and ECC. We also reviewed her initial colposcopy with cervical biopsy results revealing endocervical adenocarcinoma but final review at TB will addend this to state that its preinvasive disease only.  Previously she requested a vteb because she has a +home pregnancy test. Her LMP was 24 which is this is a viable IUP she is at 5w1d approximately today. She is feeling well and overall without complaint except some intermittent RLQ pain. Advised her to have a sonogram done which was ordered (to reassess her right adnexa as well as to see if possible IUP). I will also refer her to an OB/GYN who can help her with her obstetric care, she prefers a provider in [place]. I will have my navigation team reach out to her. Precuations provided on when to seek evaluation sooner. Plan to review her TVUS via vteb once obtained. Yamilex sent to Dr. Hernandez from [facility] in anticipation this is a viable IUP as she will need a high risk OB provider to help manage her care.   At her last visit, we again reviewed her intraoperative findings her postoperative course as well as her final pathology results.  We discussed that given her final pathology demonstrated low risk disease and the plan will be for surveillance.  The patient presents for close interval follow-up.  She recently has seen Dr. Padilla from OB/GYN who has kindly previously updated me on the patient's case.  The patient was seen for initial obstetric care and unfortunately was found to have a blighted ovum.  Her hCG levels have been downtrending and her provider has given her misoprostol which she is starting tomorrow.  Patient does notice some bleeding and had some contraction-like pain for which she thinks she passed a sac and this that was brought into the OB/GYN office for review.  Today she is overall doing well and at her last visit we discussed the potential for referral to [facility] to help and the patient declined and decided to allow her body and mind to heal from her recent losses .  We discussed plan today for repeat examination with colposcopy under biopsies and indicated.  Supportive counseling provided throughout this visit.   Today, she presents to review her workup to date. Her cervical biopsy was benign and ECC was insufficient. Her pap smear was NILM (+BV shift) and HR HPV negative.  We reviewed her TVUS which was normal. She denies any vagintis symptoms. She denies fevers, chills, night sweats, chest pain, SOB, changes in appetite, nausea, vomiting, increased abdominal girth, unintentional weight loss, abdominopelvic pain, lower extremity edema or pain and changes in bowel/bladder movements. Denies vaginal bleeding and abnormal vaginal discharge.   Imagin24 TVUS ([facility]): FINDINGS: Uterus: 7.2 cm x 3.7 cm x 4.1 cm. Slightly heterogeneous without discrete myoma Endometrium: 4 mm. Within normal limits.  Right ovary: 2.7 cm x 1.4 cm x 2.0 cm. Within normal limits. Left ovary: 2.5 cm x 1.1 cm x 1.7 cm. Within normal limits.  Fluid: None.  IMPRESSION: Normal pelvic sonogram.  3/12/24 OB US ([facility]) Uterus: Gestational Sac Size (mean): 1.1 cm Gestational Sac Shape : Normal. Crown Rump Length: Not seen Estimated Gestational Age: 5 weeks, 6 days by mean gestational sac size. Yolk Sac: Not seen Fetal Heart Rate: Not seen Right ovary: 3.3 cm x 2.7 cm x 2.8 cm including a 2.4 cm corpus luteum. 5 mm echogenic focus, likely a corpus albicans. Left ovary: 2.3 cm x 1.1 cm x 1.9 cm. Within normal limits. Fluid: None. IMPRESSION: Fluid collection in the uterus with MSD of <16 mm, which may represent an early normal IUP, pregnancy failure or a pseudosac with ectopic pregnancy. Serial hCG and ultrasound are recommended to determine the significance of these findings.

## 2024-08-09 NOTE — DISCUSSION/SUMMARY
[Reviewed Clinical Lab Test(s)] : Results of clinical tests were reviewed. [Reviewed Radiology Report(s)] : Radiology reports were reviewed. [Discuss Tests w/Referring Providers] : Results of labs/radiology studies and the treatment recommendations were discussed with performing/referring physician. [Discuss Alternatives/Risks/Benefits w/Patient] : All alternatives, risks, and benefits were discussed with the patient/family and all questions were answered.  Patient expressed good understanding and appreciates the importance of follow up as recommended. [Visit Time ___ Minutes] : [unfilled] minutes [FreeTextEntry1] : - Patients history and work up to date reviewed in detail. -Today, she presents to review her workup to date. Her cervical biopsy was benign and ECC was insufficient. Her pap smear was NILM (+BV shift) and HR HPV negative.  We reviewed her TVUS which was normal. No vaginitis symptoms but will reach out if this changes.  - For follow up in 6 months or sooner as clinically indicated.  - I spent the time noted on the day of this patient encounter preparing for, providing and documenting the above service. I have counseled and educated the patient on the differential, workup, disease course, and treatment/management plan. Education was provided to the patient during this encounter. All questions and concerns were answered and addressed in detail.

## 2024-08-09 NOTE — HISTORY OF PRESENT ILLNESS
[FreeTextEntry1] : **Please note that this visit was converted to telemed due to technical difficulties** Permission was granted for this visit.  GYN/Ref:  Laurie Machado MD  Ms. Reyes, 38 years old, s/p cone biopsy for ASC-H +HPV18 on 23.  Final path: AIS and a minute focus of stratified squamous mucinous intraepithelial lesion (SMILE) with negative margins.  ECC revealed no endocervical tissue and EMC benign.    GYN ONC Tumor Board on 23 Diagnosis: Adenocarcinoma in situ, minute foci of stratified mucinous intraepithelial lesion (SMILE) Recommendation: observation  At her prior visits, we reviewed in detail again that she previously reported to have tenderness to her RLQ and PET avid area of her right adnexa on her PET/CT. Given the prior right adnexal lesion and her discomfort she wsa treated with antibiotics and now reports resolution of her discomfort. We reviewed the interdicsiplinary team TB meeting recommendation to continue with surveillance and once she has completed child bearing to have definitive surgery done then. We also reviewed her TVUS which was overall within normal limits. We did discuss my concern of her having a PET avid lesion on the right adnexa after her pregnancy. Plan to repeat this, order placed but she noted  that this was not approved by her insurance until 90 days after the last. Therefore, we made the plan to have her PET/CT done on  to allow approval given the insurance constraints. We reviewed the limitation of ultrasound and my recommendation for interval repeat PET. We reviewed her recent conization results in detail and it revealing AIS and Minute foci of stratified mucinous intraepithelial lesion (SMILE). We discussed at her last visit her benign EMC and ECC. We also reviewed her initial colposcopy with cervical biopsy results revealing endocervical adenocarcinoma but final review at TB will addend this to state that its preinvasive disease only.  Previously she requested a vteb because she has a +home pregnancy test. Her LMP was 24 which is this is a viable IUP she is at 5w1d approximately today. She is feeling well and overall without complaint except some intermittent RLQ pain. Advised her to have a sonogram done which was ordered (to reassess her right adnexa as well as to see if possible IUP). I will also refer her to an OB/GYN who can help her with her obstetric care, she prefers a provider in [place]. I will have my navigation team reach out to her. Precuations provided on when to seek evaluation sooner. Plan to review her TVUS via vteb once obtained. Yamilex sent to Dr. Hernandez from [facility] in anticipation this is a viable IUP as she will need a high risk OB provider to help manage her care.   At her last visit, we again reviewed her intraoperative findings her postoperative course as well as her final pathology results.  We discussed that given her final pathology demonstrated low risk disease and the plan will be for surveillance.  The patient presents for close interval follow-up.  She recently has seen Dr. Padilla from OB/GYN who has kindly previously updated me on the patient's case.  The patient was seen for initial obstetric care and unfortunately was found to have a blighted ovum.  Her hCG levels have been downtrending and her provider has given her misoprostol which she is starting tomorrow.  Patient does notice some bleeding and had some contraction-like pain for which she thinks she passed a sac and this that was brought into the OB/GYN office for review.  Today she is overall doing well and at her last visit we discussed the potential for referral to [facility] to help and the patient declined and decided to allow her body and mind to heal from her recent losses .  We discussed plan today for repeat examination with colposcopy under biopsies and indicated.  Supportive counseling provided throughout this visit.   Today, she presents to review her workup to date. Her cervical biopsy was benign and ECC was insufficient. Her pap smear was NILM (+BV shift) and HR HPV negative.  We reviewed her TVUS which was normal. She denies any vagintis symptoms. She denies fevers, chills, night sweats, chest pain, SOB, changes in appetite, nausea, vomiting, increased abdominal girth, unintentional weight loss, abdominopelvic pain, lower extremity edema or pain and changes in bowel/bladder movements. Denies vaginal bleeding and abnormal vaginal discharge.   Imagin24 TVUS ([facility]): FINDINGS: Uterus: 7.2 cm x 3.7 cm x 4.1 cm. Slightly heterogeneous without discrete myoma Endometrium: 4 mm. Within normal limits.  Right ovary: 2.7 cm x 1.4 cm x 2.0 cm. Within normal limits. Left ovary: 2.5 cm x 1.1 cm x 1.7 cm. Within normal limits.  Fluid: None.  IMPRESSION: Normal pelvic sonogram.  3/12/24 OB US ([facility]) Uterus: Gestational Sac Size (mean): 1.1 cm Gestational Sac Shape : Normal. Crown Rump Length: Not seen Estimated Gestational Age: 5 weeks, 6 days by mean gestational sac size. Yolk Sac: Not seen Fetal Heart Rate: Not seen Right ovary: 3.3 cm x 2.7 cm x 2.8 cm including a 2.4 cm corpus luteum. 5 mm echogenic focus, likely a corpus albicans. Left ovary: 2.3 cm x 1.1 cm x 1.9 cm. Within normal limits. Fluid: None. IMPRESSION: Fluid collection in the uterus with MSD of <16 mm, which may represent an early normal IUP, pregnancy failure or a pseudosac with ectopic pregnancy. Serial hCG and ultrasound are recommended to determine the significance of these findings.

## 2024-08-09 NOTE — ASSESSMENT
[FreeTextEntry1] : 39 yo  with recent PPROM at 21 weeks with loss of previable infant here for follow up of her recently diagnosed endocervical adenocarcinoma now s/p CKC, ECC and D&C revealing AIS with negative margins here for surveillance follow up.

## 2024-08-26 PROBLEM — I73.89: Status: ACTIVE | Noted: 2024-08-26

## 2024-08-31 ENCOUNTER — LABORATORY RESULT (OUTPATIENT)
Age: 39
End: 2024-08-31

## 2024-08-31 ENCOUNTER — APPOINTMENT (OUTPATIENT)
Dept: FAMILY MEDICINE | Facility: CLINIC | Age: 39
End: 2024-08-31
Payer: MEDICAID

## 2024-08-31 VITALS
HEIGHT: 60 IN | WEIGHT: 173 LBS | BODY MASS INDEX: 33.96 KG/M2 | HEART RATE: 77 BPM | SYSTOLIC BLOOD PRESSURE: 106 MMHG | DIASTOLIC BLOOD PRESSURE: 74 MMHG | OXYGEN SATURATION: 95 % | TEMPERATURE: 98.2 F

## 2024-08-31 DIAGNOSIS — E78.6 HYPERLIPIDEMIA, UNSPECIFIED: ICD-10-CM

## 2024-08-31 DIAGNOSIS — I73.89 OTHER SPECIFIED PERIPHERAL VASCULAR DISEASES: ICD-10-CM

## 2024-08-31 DIAGNOSIS — E11.65 TYPE 2 DIABETES MELLITUS WITH HYPERGLYCEMIA: ICD-10-CM

## 2024-08-31 DIAGNOSIS — R79.89 OTHER SPECIFIED ABNORMAL FINDINGS OF BLOOD CHEMISTRY: ICD-10-CM

## 2024-08-31 DIAGNOSIS — E78.5 HYPERLIPIDEMIA, UNSPECIFIED: ICD-10-CM

## 2024-08-31 DIAGNOSIS — E55.9 VITAMIN D DEFICIENCY, UNSPECIFIED: ICD-10-CM

## 2024-08-31 PROCEDURE — 99214 OFFICE O/P EST MOD 30 MIN: CPT

## 2024-08-31 PROCEDURE — G2211 COMPLEX E/M VISIT ADD ON: CPT | Mod: NC

## 2024-08-31 RX ORDER — METFORMIN HYDROCHLORIDE 500 MG/1
500 TABLET, COATED ORAL
Qty: 60 | Refills: 3 | Status: ACTIVE | COMMUNITY
Start: 2024-08-31 | End: 1900-01-01

## 2024-08-31 RX ORDER — ERGOCALCIFEROL 1.25 MG/1
1.25 MG CAPSULE, LIQUID FILLED ORAL
Qty: 5 | Refills: 3 | Status: ACTIVE | COMMUNITY
Start: 2024-08-31 | End: 1900-01-01

## 2024-08-31 RX ORDER — SEMAGLUTIDE 0.68 MG/ML
2 INJECTION, SOLUTION SUBCUTANEOUS
Qty: 1 | Refills: 3 | Status: ACTIVE | COMMUNITY
Start: 2024-08-31 | End: 1900-01-01

## 2024-08-31 NOTE — INTERPRETER SERVICES
[[employer] ] : provided by [employer]   [Interpreters_IDNumber] : [id number] [Interpreters_FullName] : Nikunj [[id number]] : Greenlandic

## 2024-08-31 NOTE — INTERPRETER SERVICES
[[employer] ] : provided by [employer]   [Interpreters_IDNumber] : [id number] [Interpreters_FullName] : Nikunj [[id number]] : Angolan

## 2024-08-31 NOTE — HISTORY OF PRESENT ILLNESS
[de-identified] : 38 years old female has past medical history of endocervical adenocarcinoma in situ, gestational diabetes, vitamin D deficiency, came back to review her most recent test results.  RBC was 5.33, HCT 45.2, HGB 14.7, fasting glucose 315, HBA1c 8.9, , , alkaline phosphatase 125, HDL 43, , vitamin D 22.9, UA positive for glucose >=1000, protein 30, blood large, trace ketone, small leukocyte esterase, WBC 29, epithelial cell 7/hpf. Reports were reviewed with pt in details. Other blood tests came back wnl.

## 2024-08-31 NOTE — HISTORY OF PRESENT ILLNESS
[de-identified] : 38 years old female has past medical history of endocervical adenocarcinoma in situ, gestational diabetes, vitamin D deficiency, came back to review her most recent test results.  RBC was 5.33, HCT 45.2, HGB 14.7, fasting glucose 315, HBA1c 8.9, , , alkaline phosphatase 125, HDL 43, , vitamin D 22.9, UA positive for glucose >=1000, protein 30, blood large, trace ketone, small leukocyte esterase, WBC 29, epithelial cell 7/hpf. Reports were reviewed with pt in details. Other blood tests came back wnl.

## 2024-09-23 NOTE — HISTORY OF PRESENT ILLNESS
[de-identified] : 38 years old female has past medical history of endocervical adenocarcinoma in situ, gestational diabetes, vitamin D deficiency, came back for follow up. Metformin and Ozempic were started for new onset uncontrolled diabetes. Vitamin D was also started. Pt came back for follow up.

## 2024-09-28 ENCOUNTER — APPOINTMENT (OUTPATIENT)
Dept: FAMILY MEDICINE | Facility: CLINIC | Age: 39
End: 2024-09-28

## 2024-09-28 DIAGNOSIS — E11.65 TYPE 2 DIABETES MELLITUS WITH HYPERGLYCEMIA: ICD-10-CM

## 2024-09-28 DIAGNOSIS — E55.9 VITAMIN D DEFICIENCY, UNSPECIFIED: ICD-10-CM

## 2024-10-25 ENCOUNTER — APPOINTMENT (OUTPATIENT)
Dept: GYNECOLOGIC ONCOLOGY | Facility: CLINIC | Age: 39
End: 2024-10-25
Payer: MEDICAID

## 2024-10-25 VITALS
HEIGHT: 60 IN | DIASTOLIC BLOOD PRESSURE: 85 MMHG | HEART RATE: 79 BPM | BODY MASS INDEX: 32.98 KG/M2 | OXYGEN SATURATION: 97 % | SYSTOLIC BLOOD PRESSURE: 142 MMHG | WEIGHT: 168 LBS

## 2024-10-25 DIAGNOSIS — D06.9 CARCINOMA IN SITU OF CERVIX, UNSPECIFIED: ICD-10-CM

## 2024-10-25 PROCEDURE — 57505 ENDOCERVICAL CURETTAGE: CPT

## 2024-10-25 PROCEDURE — 99459 PELVIC EXAMINATION: CPT

## 2024-10-25 PROCEDURE — 99214 OFFICE O/P EST MOD 30 MIN: CPT | Mod: 25

## 2024-10-26 ENCOUNTER — LABORATORY RESULT (OUTPATIENT)
Age: 39
End: 2024-10-26

## 2024-10-26 ENCOUNTER — APPOINTMENT (OUTPATIENT)
Dept: FAMILY MEDICINE | Facility: CLINIC | Age: 39
End: 2024-10-26
Payer: MEDICAID

## 2024-10-26 VITALS
HEART RATE: 69 BPM | DIASTOLIC BLOOD PRESSURE: 85 MMHG | BODY MASS INDEX: 33.57 KG/M2 | TEMPERATURE: 97.9 F | SYSTOLIC BLOOD PRESSURE: 127 MMHG | WEIGHT: 171 LBS | OXYGEN SATURATION: 98 % | HEIGHT: 60 IN

## 2024-10-26 DIAGNOSIS — F32.A DEPRESSION, UNSPECIFIED: ICD-10-CM

## 2024-10-26 DIAGNOSIS — C53.0 MALIGNANT NEOPLASM OF ENDOCERVIX: ICD-10-CM

## 2024-10-26 DIAGNOSIS — E66.01 MORBID (SEVERE) OBESITY DUE TO EXCESS CALORIES: ICD-10-CM

## 2024-10-26 DIAGNOSIS — I73.89 OTHER SPECIFIED PERIPHERAL VASCULAR DISEASES: ICD-10-CM

## 2024-10-26 PROCEDURE — 99214 OFFICE O/P EST MOD 30 MIN: CPT | Mod: 25

## 2024-10-27 LAB
25(OH)D3 SERPL-MCNC: 22.3 NG/ML
ALBUMIN SERPL ELPH-MCNC: 4.4 G/DL
ALP BLD-CCNC: 83 U/L
ALT SERPL-CCNC: 105 U/L
ANION GAP SERPL CALC-SCNC: 13 MMOL/L
APPEARANCE: CLEAR
AST SERPL-CCNC: 65 U/L
BASOPHILS # BLD AUTO: 0.03 K/UL
BASOPHILS NFR BLD AUTO: 0.4 %
BILIRUB SERPL-MCNC: 0.7 MG/DL
BILIRUBIN URINE: NEGATIVE
BLOOD URINE: ABNORMAL
BUN SERPL-MCNC: 8 MG/DL
CALCIUM SERPL-MCNC: 9.3 MG/DL
CHLORIDE SERPL-SCNC: 100 MMOL/L
CHOLEST SERPL-MCNC: 189 MG/DL
CO2 SERPL-SCNC: 23 MMOL/L
COLOR: YELLOW
CREAT SERPL-MCNC: 0.42 MG/DL
CREAT SPEC-SCNC: 91 MG/DL
EGFR: 128 ML/MIN/1.73M2
EOSINOPHIL # BLD AUTO: 0.12 K/UL
EOSINOPHIL NFR BLD AUTO: 1.5 %
ESTIMATED AVERAGE GLUCOSE: 212 MG/DL
GLUCOSE QUALITATIVE U: >=1000 MG/DL
GLUCOSE SERPL-MCNC: 177 MG/DL
HBA1C MFR BLD HPLC: 9 %
HCT VFR BLD CALC: 44.6 %
HDLC SERPL-MCNC: 48 MG/DL
HGB BLD-MCNC: 14.5 G/DL
IMM GRANULOCYTES NFR BLD AUTO: 0.4 %
KETONES URINE: NEGATIVE MG/DL
LDLC SERPL CALC-MCNC: 115 MG/DL
LEUKOCYTE ESTERASE URINE: ABNORMAL
LYMPHOCYTES # BLD AUTO: 2.41 K/UL
LYMPHOCYTES NFR BLD AUTO: 30.1 %
MAN DIFF?: NORMAL
MCHC RBC-ENTMCNC: 27.7 PG
MCHC RBC-ENTMCNC: 32.5 GM/DL
MCV RBC AUTO: 85.3 FL
MICROALBUMIN 24H UR DL<=1MG/L-MCNC: 4.6 MG/DL
MICROALBUMIN/CREAT 24H UR-RTO: 50 MG/G
MONOCYTES # BLD AUTO: 0.48 K/UL
MONOCYTES NFR BLD AUTO: 6 %
NEUTROPHILS # BLD AUTO: 4.93 K/UL
NEUTROPHILS NFR BLD AUTO: 61.6 %
NITRITE URINE: NEGATIVE
NONHDLC SERPL-MCNC: 142 MG/DL
PH URINE: 6
PLATELET # BLD AUTO: 265 K/UL
POTASSIUM SERPL-SCNC: 4.1 MMOL/L
PROT SERPL-MCNC: 7.5 G/DL
PROTEIN URINE: NORMAL MG/DL
RBC # BLD: 5.23 M/UL
RBC # FLD: 13 %
SODIUM SERPL-SCNC: 136 MMOL/L
SPECIFIC GRAVITY URINE: 1.02
T3 SERPL-MCNC: 144 NG/DL
T3FREE SERPL-MCNC: 3.57 PG/ML
T4 FREE SERPL-MCNC: 1.1 NG/DL
T4 SERPL-MCNC: 8 UG/DL
TRIGL SERPL-MCNC: 152 MG/DL
TSH SERPL-ACNC: 1.38 UIU/ML
URATE SERPL-MCNC: 4.3 MG/DL
UROBILINOGEN URINE: 0.2 MG/DL
VIT B12 SERPL-MCNC: 497 PG/ML
WBC # FLD AUTO: 8 K/UL

## 2024-10-28 PROBLEM — R80.9 PROTEINURIA: Status: ACTIVE | Noted: 2024-10-28

## 2024-10-29 ENCOUNTER — APPOINTMENT (OUTPATIENT)
Dept: FAMILY MEDICINE | Facility: CLINIC | Age: 39
End: 2024-10-29
Payer: MEDICAID

## 2024-10-29 VITALS — TEMPERATURE: 98.3 F | DIASTOLIC BLOOD PRESSURE: 80 MMHG | SYSTOLIC BLOOD PRESSURE: 127 MMHG

## 2024-10-29 DIAGNOSIS — E78.6 HYPERLIPIDEMIA, UNSPECIFIED: ICD-10-CM

## 2024-10-29 DIAGNOSIS — R79.89 OTHER SPECIFIED ABNORMAL FINDINGS OF BLOOD CHEMISTRY: ICD-10-CM

## 2024-10-29 DIAGNOSIS — E11.65 TYPE 2 DIABETES MELLITUS WITH HYPERGLYCEMIA: ICD-10-CM

## 2024-10-29 DIAGNOSIS — E55.9 VITAMIN D DEFICIENCY, UNSPECIFIED: ICD-10-CM

## 2024-10-29 DIAGNOSIS — E78.5 HYPERLIPIDEMIA, UNSPECIFIED: ICD-10-CM

## 2024-10-29 DIAGNOSIS — R82.90 UNSPECIFIED ABNORMAL FINDINGS IN URINE: ICD-10-CM

## 2024-10-29 DIAGNOSIS — R80.9 PROTEINURIA, UNSPECIFIED: ICD-10-CM

## 2024-10-29 PROCEDURE — G2211 COMPLEX E/M VISIT ADD ON: CPT | Mod: NC

## 2024-10-29 PROCEDURE — 99214 OFFICE O/P EST MOD 30 MIN: CPT

## 2024-10-29 RX ORDER — SITAGLIPTIN AND METFORMIN HYDROCHLORIDE 50; 1000 MG/1; MG/1
50-1000 TABLET, FILM COATED ORAL
Qty: 180 | Refills: 3 | Status: ACTIVE | COMMUNITY
Start: 2024-10-29 | End: 1900-01-01

## 2024-10-31 ENCOUNTER — NON-APPOINTMENT (OUTPATIENT)
Age: 39
End: 2024-10-31

## 2024-10-31 LAB
CANDIDA VAG CYTO: NOT DETECTED
CORE LAB BIOPSY: NORMAL
G VAGINALIS+PREV SP MTYP VAG QL MICRO: NOT DETECTED
HPV HIGH+LOW RISK DNA PNL CVX: NOT DETECTED
T VAGINALIS VAG QL WET PREP: NOT DETECTED

## 2024-11-02 LAB — CYTOLOGY CVX/VAG DOC THIN PREP: ABNORMAL

## 2024-11-07 ENCOUNTER — NON-APPOINTMENT (OUTPATIENT)
Age: 39
End: 2024-11-07

## 2024-11-07 ENCOUNTER — APPOINTMENT (OUTPATIENT)
Facility: CLINIC | Age: 39
End: 2024-11-07

## 2024-11-07 DIAGNOSIS — Z98.890 OTHER SPECIFIED POSTPROCEDURAL STATES: ICD-10-CM

## 2024-11-07 DIAGNOSIS — B96.89 ACUTE VAGINITIS: ICD-10-CM

## 2024-11-07 DIAGNOSIS — N76.0 ACUTE VAGINITIS: ICD-10-CM

## 2024-11-07 RX ORDER — ASPIRIN 81 MG/1
81 TABLET, CHEWABLE ORAL
Refills: 0 | Status: ACTIVE | COMMUNITY

## 2024-11-08 ENCOUNTER — APPOINTMENT (OUTPATIENT)
Dept: GYNECOLOGIC ONCOLOGY | Facility: CLINIC | Age: 39
End: 2024-11-08

## 2025-02-08 ENCOUNTER — APPOINTMENT (OUTPATIENT)
Dept: FAMILY MEDICINE | Facility: CLINIC | Age: 40
End: 2025-02-08

## 2025-02-08 DIAGNOSIS — C53.9 MALIGNANT NEOPLASM OF CERVIX UTERI, UNSPECIFIED: ICD-10-CM

## 2025-02-08 DIAGNOSIS — E78.6 HYPERLIPIDEMIA, UNSPECIFIED: ICD-10-CM

## 2025-02-08 DIAGNOSIS — E55.9 VITAMIN D DEFICIENCY, UNSPECIFIED: ICD-10-CM

## 2025-02-08 DIAGNOSIS — Z00.01 ENCOUNTER FOR GENERAL ADULT MEDICAL EXAMINATION WITH ABNORMAL FINDINGS: ICD-10-CM

## 2025-02-08 DIAGNOSIS — E78.5 HYPERLIPIDEMIA, UNSPECIFIED: ICD-10-CM

## 2025-02-08 DIAGNOSIS — I73.89 OTHER SPECIFIED PERIPHERAL VASCULAR DISEASES: ICD-10-CM

## 2025-02-08 DIAGNOSIS — R79.89 OTHER SPECIFIED ABNORMAL FINDINGS OF BLOOD CHEMISTRY: ICD-10-CM

## 2025-02-08 DIAGNOSIS — E11.65 TYPE 2 DIABETES MELLITUS WITH HYPERGLYCEMIA: ICD-10-CM

## 2025-02-08 DIAGNOSIS — R80.9 PROTEINURIA, UNSPECIFIED: ICD-10-CM

## 2025-03-07 DIAGNOSIS — Z30.42 ENCOUNTER FOR SURVEILLANCE OF INJECTABLE CONTRACEPTIVE: ICD-10-CM

## 2025-03-07 RX ORDER — MEDROXYPROGESTERONE ACETATE 150 MG/ML
150 INJECTION, SUSPENSION INTRAMUSCULAR
Qty: 1 | Refills: 3 | Status: ACTIVE | COMMUNITY
Start: 2025-03-07 | End: 1900-01-01

## 2025-03-13 ENCOUNTER — APPOINTMENT (OUTPATIENT)
Facility: CLINIC | Age: 40
End: 2025-03-13

## 2025-03-13 VITALS — SYSTOLIC BLOOD PRESSURE: 128 MMHG | DIASTOLIC BLOOD PRESSURE: 88 MMHG

## 2025-03-13 LAB — HCG UR QL: NEGATIVE

## 2025-04-23 NOTE — CHART NOTE - NSCHARTNOTEFT_GEN_A_CORE
Gyn Onc Attending Brief Note    Patient seen on postpartum, resting comfortably with  sleeping at bedside as well. Appreciate excellent care by OB team. Will assure she has close interval follow up with me.     Arlene Diaz MD Presents at 37w2d, reports getting increasingly uncomfortable.  Fetus active.  Denies painful contractions, leaking fluid, vaginal bleeding. Itching of belly rash is improved with medication though not completely relieved.     1. Supervision of normal first pregnancy, antepartum (CMD) (Primary)  General questions answered and anticipatory guidance given. Discussed possible IOL at 39 weeks, she is interested. Will check cervix next week and discuss further.     2. PUPP (pruritic urticarial papules and plaques of pregnancy) (CMD)  Improved but still present.           Yes

## (undated) DEVICE — DRSG DERMABOND MINI

## (undated) DEVICE — DRAPE IRRIGATION POUCH 19X23"

## (undated) DEVICE — DRAPE LIGHT HANDLE COVER (GREEN)

## (undated) DEVICE — VISITEC 4X4

## (undated) DEVICE — SUT MONOCRYL 2-0 27" SH VIOLET

## (undated) DEVICE — PRESSURE INFUSOR BAG 1000ML

## (undated) DEVICE — DRAPE TOWEL BLUE 17" X 24"

## (undated) DEVICE — PREP BETADINE SPONGE STICKS

## (undated) DEVICE — SUT VICRYL 3-0 27" SH UNDYED

## (undated) DEVICE — DRSG TELFA 3 X 8

## (undated) DEVICE — BASIN SET DOUBLE

## (undated) DEVICE — LABELS BLANK W PEN

## (undated) DEVICE — POSITIONER STRAP ARMBOARD VELCRO TS-30

## (undated) DEVICE — Device

## (undated) DEVICE — LIGASURE SMALL JAW

## (undated) DEVICE — VENODYNE/SCD SLEEVE CALF MEDIUM

## (undated) DEVICE — SUT SOFSILK 2-0 18" C-15

## (undated) DEVICE — PROTECTOR HEEL / ELBOW FLUFFY

## (undated) DEVICE — ELCTR BOVIE TIP BLADE INSULATED 2.75" EDGE

## (undated) DEVICE — TUBING SUCTION NONCONDUCTIVE 6MM X 12FT

## (undated) DEVICE — SOL IRR POUR NS 0.9% 1000ML

## (undated) DEVICE — DRSG PAD SANITARY OB

## (undated) DEVICE — WARMING BLANKET UPPER ADULT

## (undated) DEVICE — NDL HYPO REGULAR BEVEL 25G X 1.5" (BLUE)

## (undated) DEVICE — GLV 6.5 PROTEXIS (CREAM) MICRO

## (undated) DEVICE — ELCTR BOVIE PENCIL SMOKE EVACUATION

## (undated) DEVICE — CLN COAG SCRUBBIE TIP

## (undated) DEVICE — GOWN LG

## (undated) DEVICE — BLADE SURGICAL #11 CARBON

## (undated) DEVICE — WARMING BLANKET FULL ADULT

## (undated) DEVICE — PACK GENERAL MINOR

## (undated) DEVICE — SYR ASEPTO

## (undated) DEVICE — TUBING IRR SET FOR CYSTOSCOPY 77"

## (undated) DEVICE — SOL IRR POUR H2O 500ML

## (undated) DEVICE — MARKING PEN W RULER

## (undated) DEVICE — PACK D&C

## (undated) DEVICE — SUT VICRYL 2-0 27" SH UNDYED

## (undated) DEVICE — SUCTION YANKAUER TAPERED BULBOUS NO VENT